# Patient Record
Sex: FEMALE | Race: AMERICAN INDIAN OR ALASKA NATIVE
[De-identification: names, ages, dates, MRNs, and addresses within clinical notes are randomized per-mention and may not be internally consistent; named-entity substitution may affect disease eponyms.]

---

## 2017-02-25 ENCOUNTER — HOSPITAL ENCOUNTER (EMERGENCY)
Dept: HOSPITAL 5 - ED | Age: 67
Discharge: HOME | End: 2017-02-25
Payer: MEDICARE

## 2017-02-25 VITALS — DIASTOLIC BLOOD PRESSURE: 84 MMHG | SYSTOLIC BLOOD PRESSURE: 209 MMHG

## 2017-02-25 DIAGNOSIS — I10: ICD-10-CM

## 2017-02-25 DIAGNOSIS — I25.2: ICD-10-CM

## 2017-02-25 DIAGNOSIS — I50.9: ICD-10-CM
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
## 2017-02-25 DIAGNOSIS — M25.50: ICD-10-CM

## 2017-02-25 DIAGNOSIS — Z95.1: ICD-10-CM

## 2017-02-25 DIAGNOSIS — E11.9: ICD-10-CM
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
## 2017-02-25 DIAGNOSIS — M51.36: Primary | ICD-10-CM
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
## 2017-02-25 DIAGNOSIS — J44.9: ICD-10-CM

## 2017-02-25 DIAGNOSIS — M19.90: ICD-10-CM

## 2017-02-25 DIAGNOSIS — M85.80: ICD-10-CM

## 2017-02-25 DIAGNOSIS — K21.9: ICD-10-CM

## 2017-02-25 PROCEDURE — 72170 X-RAY EXAM OF PELVIS: CPT

## 2017-02-25 PROCEDURE — 99283 EMERGENCY DEPT VISIT LOW MDM: CPT

## 2017-02-25 PROCEDURE — 72100 X-RAY EXAM L-S SPINE 2/3 VWS: CPT

## 2017-02-25 NOTE — EMERGENCY DEPARTMENT REPORT
ED Fall HPI





- General


Chief Complaint: Fall


Stated Complaint: INJURY FROM FALL


Time Seen by Provider: 17 09:01


Source: patient, family


Mode of arrival: Ambulatory





- History of Present Illness


Initial Comments: 





Patient here with family and she reports that she stumbled and fell while 

stepping down and walking approximately 2 weeks ago.  She is complaining the 

pain in her hips and pelvic area and also complaining the lower back pain.  

Patient is ambulatory.  She denies any blood in the urine or stool.  Denies any 

loss of bowel or bladder control.  Denies any head injury.  Denies any numbness 

or tingling to extremities.  Denies any chest pain or shortness of breath.


MD Complaint: fall


Onset/Timin


-: week(s)


Fall From: standing


When Fall Occurred: # days PTA (14)


Fall Witnessed: yes, by family


Place Fall Occurred: home


Loss of Consciousness: none


Prolonged Down Time?: no


Symptoms Prior to Fall: none


Location: back, pelvis (pelvis and bilateral hip)


Severity: moderate


Severity scale (0 -10): 6


Quality: aching


Context: tripped/slipped


Associated Symptoms: denies: headache, neck pain, numbness, weakness, chest 

paint, shortness of breath, abdominal pain, hematuria, unable to walk, 

lightheaded, vertigo, confusion





- Related Data


 Home Medications











 Medication  Instructions  Recorded  Confirmed  Last Taken


 


Clopidogrel [Plavix] 75 mg PO DAILY 12/09/14 10/16/15 05/13/16


 


Gabapentin 300 mg PO BID 12/09/14 10/16/15 05/18/16


 


Insulin NPH Hum/Reg Insulin Hm 45 units SQ BID 12/09/14 10/16/15 05/18/16





[HumuLIN 70-30 Vial]    


 


Ipratropium/Albuterol Sulfate 1 spray IH QID PRN 10/16/15 10/16/15 05/18/16





[Combivent Respimat]    


 


Lisinopril [Zestril TAB] 20 mg PO BID 10/16/15 10/16/15 05/18/16


 


traMADol [Ultram 50 MG tab] 50 mg PO PRN 10/16/15 10/16/15 05/18/16








 Previous Rx's











 Medication  Instructions  Recorded  Last Taken  Type


 


Nitroglycerin [Nitrostat] 0.4 mg SL Q5M #100 tab 14 Rx


 


Aspirin [Aspirin BABY CHEW TAB] 81 mg PO QDAY #30 tab.chew 12/19/14 05/15/16 Rx


 


Furosemide [Lasix TAB] 20 mg PO QDAY #30 tablet 14 Rx


 


Lactobacillus Acidophil [Lactinex] 1 each PO TID #30 tablet 10/21/15 05/18/16 Rx


 


Levofloxacin [Levaquin TAB] 750 mg PO QDAY #10 tablet 10/21/15 05/18/16 Rx


 


Metoprolol [Lopressor TAB] 12.5 mg PO BID #30 tablet 10/21/15 05/18/16 Rx


 


Sulfamethoxazole/Trimethoprim 1 each PO BID #20 tablet 10/21/15 05/18/16 Rx





[Bactrim DS TAB]    


 


metroNIDAZOLE [Flagyl TAB] 500 mg PO Q8H #30 tablet 10/21/15 05/18/16 Rx


 


predniSONE [Deltasone] 5 mg PO QDAY #14 tab 17 Unknown Rx


 


traMADol [Ultram 50 MG tab] 50 mg PO Q6HR PRN #20 tablet 17 Unknown Rx











 Allergies











Allergy/AdvReac Type Severity Reaction Status Date / Time


 


atorvastatin calcium Allergy  Unknown Verified 17 08:03





[From Lipitor]     


 


Iodinated Contrast Media - Allergy  Unknown Verified 17 08:03





IV Dye     


 


Penicillins Allergy  Unknown Verified 17 08:03


 


tomato Allergy  Unknown Verified 17 08:03














ED Review of Systems


ROS: 


Stated complaint: INJURY FROM FALL


Other details as noted in HPI





Comment: All other systems reviewed and negative


Constitutional: denies: chills, fever


Eyes: denies: vision change


ENT: denies: ear pain, hearing loss


Respiratory: no symptoms reported


Cardiovascular: denies: chest pain, palpitations, edema, syncope


Gastrointestinal: denies: abdominal pain, nausea, vomiting


Genitourinary: denies: urgency, dysuria, frequency, hematuria


Musculoskeletal: back pain, arthralgia


Skin: denies: rash


Neurological: abnormal gait (abnormal gait due to pain in hips).  denies: 

headache, numbness, paresthesias, vertigo





ED Past Medical Hx





- Past Medical History


Previous Medical History?: Yes


Hx Hypertension: Yes


Hx Heart Attack/AMI: Yes


Hx Congestive Heart Failure: Yes


Hx Diabetes: Yes


Hx GERD: Yes (GERD)


Hx Renal Disease: Yes (renal stones)


Hx Arthritis: Yes


Hx Kidney Stones: Yes


Hx COPD: Yes


Hx HIV: No





- Surgical History


Past Surgical History?: Yes


Hx Coronary Stent: Yes


Hx Open Heart Surgery: Yes (CABG X2)


Additional Surgical History: Open heart





- Family History


Family history: diabetes, hypertension





- Social History


Smoking Status: Never Smoker


Substance Use Type: None





- Medications


Home Medications: 


 Home Medications











 Medication  Instructions  Recorded  Confirmed  Last Taken  Type


 


Clopidogrel [Plavix] 75 mg PO DAILY 12/09/14 10/16/15 05/13/16 History


 


Gabapentin 300 mg PO BID 12/09/14 10/16/15 05/18/16 History


 


Insulin NPH Hum/Reg Insulin Hm 45 units SQ BID 12/09/14 10/16/15 05/18/16 

History





[HumuLIN 70-30 Vial]     


 


Nitroglycerin [Nitrostat] 0.4 mg SL Q5M #100 tab 12/18/14 10/16/15 03/01/16 Rx


 


Aspirin [Aspirin BABY CHEW TAB] 81 mg PO QDAY #30 tab.chew 12/19/14 10/16/15 05/

15/16 Rx


 


Furosemide [Lasix TAB] 20 mg PO QDAY #30 tablet 12/19/14 10/16/15 05/18/16 Rx


 


Ipratropium/Albuterol Sulfate 1 spray IH QID PRN 10/16/15 10/16/15 05/18/16 

History





[Combivent Respimat]     


 


Lisinopril [Zestril TAB] 20 mg PO BID 10/16/15 10/16/15 05/18/16 History


 


traMADol [Ultram 50 MG tab] 50 mg PO PRN 10/16/15 10/16/15 05/18/16 History


 


Lactobacillus Acidophil [Lactinex] 1 each PO TID #30 tablet 10/21/15  05/18/16 

Rx


 


Levofloxacin [Levaquin TAB] 750 mg PO QDAY #10 tablet 10/21/15  05/18/16 Rx


 


Metoprolol [Lopressor TAB] 12.5 mg PO BID #30 tablet 10/21/15  05/18/16 Rx


 


Sulfamethoxazole/Trimethoprim 1 each PO BID #20 tablet 10/21/15  05/18/16 Rx





[Bactrim DS TAB]     


 


metroNIDAZOLE [Flagyl TAB] 500 mg PO Q8H #30 tablet 10/21/15  05/18/16 Rx


 


predniSONE [Deltasone] 5 mg PO QDAY #14 tab 17  Unknown Rx


 


traMADol [Ultram 50 MG tab] 50 mg PO Q6HR PRN #20 tablet 17  Unknown Rx














ED Physical Exam





- General


Limitations: No Limitations


General appearance: alert, in no apparent distress





- Head


Head exam: Present: atraumatic, normocephalic, normal inspection





- Expanded Head Exam


  ** Expanded


Head exam: Absent: laceration, abrasion, contusion, hematoma, racoon eyes, 

phoenix's sign, general tenderness, tenderness of temporal artery, CSF rhinorrhea

, CSF otorrhea





- Eye


Eye exam: Present: normal appearance, PERRL, EOMI.  Absent: periorbital swelling

, periorbital tenderness


Pupils: Present: normal accommodation





- ENT


ENT exam: Present: normal exam, normal orophraynx, mucous membranes moist, TM's 

normal bilaterally, normal external ear exam





- Neck


Neck exam: Present: normal inspection, full ROM.  Absent: tenderness, 

meningismus, lymphadenopathy





- Respiratory


Respiratory exam: Present: normal lung sounds bilaterally.  Absent: respiratory 

distress, chest wall tenderness





- Cardiovascular


Cardiovascular Exam: Present: regular rate, normal rhythm, normal heart sounds





- GI/Abdominal


GI/Abdominal exam: Present: soft, normal bowel sounds.  Absent: distended, 

tenderness, guarding, rebound, rigid





- Extremities Exam


Extremities exam: Present: normal inspection, full ROM, normal capillary refill

, other (nontender to palpate in both hips.  No shortening of the legs.  

Patient will good color movement temperature and sensation to extremities.  +2 

pedal pulses.  No neurovascular compromise.  Patient able to ambulate with 

minimal assistance.).  Absent: tenderness, pedal edema, joint swelling, calf 

tenderness





- Back Exam


Back exam: Present: normal inspection, full ROM.  Absent: tenderness, CVA 

tenderness (R), CVA tenderness (L), muscle spasm, paraspinal tenderness, 

vertebral tenderness, rash noted





- Expanded Back Exam


  ** Expanded


Back exam: Absent: saddle anesthesia


Back exam: Negative Straight Leg Raising: Left, Right





- Neurological Exam


Neurological exam: Present: alert, oriented X3, abnormal gait (abnormal gait 

due to pelvic pain), reflexes normal.  Absent: motor sensory deficit





- Psychiatric


Psychiatric exam: Present: normal affect, normal mood





- Skin


Skin exam: Present: warm, dry, intact, normal color.  Absent: rash





ED Course


 Vital Signs











  17





  07:54


 


Temperature 98.8 F


 


Pulse Rate 67


 


Respiratory 17





Rate 


 


Blood Pressure 201/86


 


O2 Sat by Pulse 100





Oximetry 














- Reevaluation(s)


Reevaluation #1: 





17 11:57


given Norco 5/325 mg one tablet by mouth in emergency room which relieved her 

pain.





ED Medical Decision Making





- Radiology Data


Radiology results: report reviewed





X-ray of pelvic revealed no acute fracture or dislocation.


x-ray  lumbar spine revealed osteopenia with degenerative changes 





- Medical Decision Making





ED course: I informed patient and her family member that x-ray of her back and 

pelvis area revealed no acute findings.  Told her that she has some arthritis 

in her lower back along with osteopenia which is a precursor for osteoporosis.  

Informed patient that she needs to follow-up with her primary care doctor and 

started taking calcium and vitamin D supplements to prevent progression of 

osteopenia.  Discharge instructions given on fall prevention an elderly, 

osteopenia, degenerative disc disease.  Patient and family states understanding 

of diagnosis and treatment plan. Pt Also requesting refill on her prednisone 

which she takes for sarcoidosis in her lungs.  She has an appointment with her 

lung doctor in 2 weeks and she ran out.  He says she takes 5 mg daily.  Patient 

discharged home with prescription for Ultram and prednisone.


Critical care attestation.: 


If time is entered above; I have spent that time in minutes in the direct care 

of this critically ill patient, excluding procedure time.








ED Disposition


Clinical Impression: 


 Arthralgia of multiple sites, Osteopenia, Degenerative disc disease, lumbar





Fall, accidental


Qualifiers:


 Encounter type: initial encounter Qualified Code(s): W19.XXXA - Unspecified 

fall, initial encounter





Pain in lower back


Qualifiers:


 Chronicity: unspecified Back pain laterality: bilateral Sciatica presence: 

without sciatica Qualified Code(s): M54.5 - Low back pain





Disposition: DISCHARGED TO HOME OR SELFCARE


Is pt being admited?: No


Does the pt Need Aspirin: No


Condition: Stable


Instructions:  Degenerative Disc Disease (ED), Fall Prevention for Older Adults 

(ED), Arthralgia (ED)


Additional Instructions: 


You have a condition called osteopenia which is a precursor for osteoporosis.  

You will need  to start taking calcium and vitamin D and follow-up with primary 

care physician for management.


Please follow-up with orthopedic doctor if he continues to have pain.


Prescriptions: 


predniSONE [Deltasone] 5 mg PO QDAY #14 tab


traMADol [Ultram 50 MG tab] 50 mg PO Q6HR PRN #20 tablet


 PRN Reason: Pain


Referrals: 


PRIMARY CARE,MD [Primary Care Provider] - 3-5 Days


DM GODFREY MD [Staff Physician] - 3-5 Days


Forms:  Accompanied Note

## 2017-02-25 NOTE — XRAY REPORT
AP PELVIS:



History: Fall with pelvic and hip pain.



AP view of the pelvis shows normal pelvic contour and soft

tissues. The hips are symmetric and within normal limits as are the 

sacroiliac joints.



IMPRESSION:

No acute injury is appreciated.

## 2017-02-25 NOTE — XRAY REPORT
LUMBOSACRAL SPINE, 3 VIEWS:



History: Back pain



Findings: Mild osteopenia is suspected. The vertebral bodies, disk 

spaces and posterior elements are intact.  No compression deformity or 

malalignment.  Mild multilevel degenerative disc disease and facet 

arthropathy are noted. The SI joints are symmetric and unremarkable.



Impression:

No acute injury is identified.

Osteopenia.

Degenerative changes.

## 2017-12-09 ENCOUNTER — HOSPITAL ENCOUNTER (EMERGENCY)
Dept: HOSPITAL 5 - ED | Age: 67
Discharge: LEFT BEFORE DISCHARGE | End: 2017-12-09
Payer: MEDICARE

## 2017-12-09 VITALS — SYSTOLIC BLOOD PRESSURE: 197 MMHG | DIASTOLIC BLOOD PRESSURE: 83 MMHG

## 2017-12-09 DIAGNOSIS — Z86.73: ICD-10-CM

## 2017-12-09 DIAGNOSIS — I13.0: ICD-10-CM

## 2017-12-09 DIAGNOSIS — Z91.041: ICD-10-CM

## 2017-12-09 DIAGNOSIS — E87.6: ICD-10-CM

## 2017-12-09 DIAGNOSIS — N18.9: ICD-10-CM

## 2017-12-09 DIAGNOSIS — I50.9: ICD-10-CM

## 2017-12-09 DIAGNOSIS — R55: Primary | ICD-10-CM

## 2017-12-09 DIAGNOSIS — E11.22: ICD-10-CM

## 2017-12-09 DIAGNOSIS — M19.90: ICD-10-CM

## 2017-12-09 DIAGNOSIS — J44.9: ICD-10-CM

## 2017-12-09 DIAGNOSIS — Z95.818: ICD-10-CM

## 2017-12-09 DIAGNOSIS — I25.2: ICD-10-CM

## 2017-12-09 DIAGNOSIS — Z95.1: ICD-10-CM

## 2017-12-09 DIAGNOSIS — Z88.8: ICD-10-CM

## 2017-12-09 LAB
ANION GAP SERPL CALC-SCNC: 14 MMOL/L
APTT BLD: 23.7 SEC. (ref 24.2–36.6)
BASOPHILS NFR BLD AUTO: 0.4 % (ref 0–1.8)
BUN SERPL-MCNC: 17 MG/DL (ref 7–17)
BUN/CREAT SERPL: 13 %
CALCIUM SERPL-MCNC: 8.5 MG/DL (ref 8.4–10.2)
CHLORIDE SERPL-SCNC: 103.7 MMOL/L (ref 98–107)
CO2 SERPL-SCNC: 28 MMOL/L (ref 22–30)
EOSINOPHIL NFR BLD AUTO: 1.4 % (ref 0–4.3)
GLUCOSE SERPL-MCNC: 65 MG/DL (ref 65–100)
HCT VFR BLD CALC: 41.8 % (ref 30.3–42.9)
HGB BLD-MCNC: 13.4 GM/DL (ref 10.1–14.3)
INR PPP: 0.83 (ref 0.87–1.13)
MCH RBC QN AUTO: 26 PG (ref 28–32)
MCHC RBC AUTO-ENTMCNC: 32 % (ref 30–34)
MCV RBC AUTO: 81 FL (ref 79–97)
PLATELET # BLD: 178 K/MM3 (ref 140–440)
POTASSIUM SERPL-SCNC: 3.4 MMOL/L (ref 3.6–5)
RBC # BLD AUTO: 5.16 M/MM3 (ref 3.65–5.03)
SODIUM SERPL-SCNC: 142 MMOL/L (ref 137–145)
WBC # BLD AUTO: 9 K/MM3 (ref 4.5–11)

## 2017-12-09 PROCEDURE — 36415 COLL VENOUS BLD VENIPUNCTURE: CPT

## 2017-12-09 PROCEDURE — 85610 PROTHROMBIN TIME: CPT

## 2017-12-09 PROCEDURE — 93005 ELECTROCARDIOGRAM TRACING: CPT

## 2017-12-09 PROCEDURE — 72170 X-RAY EXAM OF PELVIS: CPT

## 2017-12-09 PROCEDURE — 84484 ASSAY OF TROPONIN QUANT: CPT

## 2017-12-09 PROCEDURE — 72125 CT NECK SPINE W/O DYE: CPT

## 2017-12-09 PROCEDURE — 93010 ELECTROCARDIOGRAM REPORT: CPT

## 2017-12-09 PROCEDURE — 99284 EMERGENCY DEPT VISIT MOD MDM: CPT

## 2017-12-09 PROCEDURE — 70450 CT HEAD/BRAIN W/O DYE: CPT

## 2017-12-09 PROCEDURE — 85025 COMPLETE CBC W/AUTO DIFF WBC: CPT

## 2017-12-09 PROCEDURE — 83735 ASSAY OF MAGNESIUM: CPT

## 2017-12-09 PROCEDURE — 85730 THROMBOPLASTIN TIME PARTIAL: CPT

## 2017-12-09 PROCEDURE — 80048 BASIC METABOLIC PNL TOTAL CA: CPT

## 2017-12-09 NOTE — XRAY REPORT
XRAY RIGHT TIBIA AND FIBULA TWO VIEWS: 12/09/17 08:04:00





CLINICAL: Weakness and unable to bear weight.



FINDINGS: No fracture or dislocation. Normal alignment at the knee and 

at the ankle.  No knee joint effusion. Surgical clips in the medial 

soft tissues.No soft tissue air in the soft tissue edema.



IMPRESSION: Negative study.

## 2017-12-09 NOTE — XRAY REPORT
XRAY RIGHT FEMUR TWO VIEWS: 12/09/17 08:04:00





CLINICAL: Weakness and unable to bear weight on the right leg.



FINDINGS: Normal alignment at the hip and knee. Moderate osteoarthritis 

of the right hip.  No fracture or dislocation.  Vascular calcifications 

in otherwise normal soft tissues.  The knee joint is unremarkable. No 

fracture.  





IMPRESSION: Moderate osteoarthritis of the right hip.  The femur is 

otherwise normal.

## 2017-12-09 NOTE — CAT SCAN REPORT
CT HEAD WITHOUT CONTRAST: 12/09/17 09:57



CLINICAL: Dizziness.  On blood thinners.



TECHNIQUE: 2.5-mm noncontrast scans.



COMPARISON:None



FINDINGS: The ventricles and sulci are normal for age.Moderate 

bilateral periventricular white matter hypodensities.  Small right 

frontal white matter chronic lacunar infarct.  A right thalamic lacunar 

infarct is of uncertain age.  No mass or mass effect.  No hemorrhage, 

edema or extra-axial collection.  The sinuses are clear.  Normal orbits 

and soft tissues.  The calvarium and skull base are intact.



IMPRESSION: A right thalamic lacunar infarct of uncertain age.  Chronic 

right frontal white matter or infarct and moderate chronic white matter 

microangiopathy. No hemorrhage.

## 2017-12-09 NOTE — EMERGENCY DEPARTMENT REPORT
ED Dizziness HPI





- General


Chief Complaint: Neuro Symptoms/Deficit


Stated Complaint: FALL


Time Seen by Provider: 12/09/17 11:23


Source: patient


Mode of arrival: Ambulatory


Limitations: No Limitations





- History of Present Illness


Initial Comments: 





67-year-old female with a past medical history of CABG, CHF, COPD, CVA, diabetes

, GERD, and hypertension presents to the hospital with complaints of near 

syncopal episode with fall.  Patient lives alone and her son came to visit.  

Upon ambulating to the door to let him and she felt dizzy to the point of 

falling and striking the back of her head on the door.  Her son witnessed the 

episode and no LOC reported.  Upon trying to stand patient felt like a pulling 

sensation in her right leg like it was giving out on her and she is complaining 

of mild headache after fall to the back of her head.  Patient complains of 

chronic intermittent chest pain at is unchanged from her baseline.  Patient 

denies shortness of breath, nausea, vomiting, or diaphoresis.  Patient saw her 

outpatient cardiologist wanted December 6 and had her metoprolol ER increased 

from 25 mg to 50 mg.  Since this increased patient has been sleeping more and 

having dizzy episodes.  Cardiologist group: Dr. AJIT Simons with [facility].





- Related Data


 Home Medications











 Medication  Instructions  Recorded  Confirmed  Last Taken


 


Clopidogrel [Plavix] 75 mg PO DAILY 12/09/14 10/16/15 05/13/16


 


Gabapentin 300 mg PO BID 12/09/14 10/16/15 05/18/16


 


Insulin NPH Hum/Reg Insulin Hm 45 units SQ BID 12/09/14 10/16/15 05/18/16





[HumuLIN 70-30 Vial]    


 


Ipratropium/Albuterol Sulfate 1 spray IH QID PRN 10/16/15 10/16/15 05/18/16





[Combivent Respimat]    


 


Lisinopril [Zestril TAB] 20 mg PO BID 10/16/15 10/16/15 05/18/16


 


traMADol [Ultram 50 MG tab] 50 mg PO PRN 10/16/15 10/16/15 05/18/16








 Previous Rx's











 Medication  Instructions  Recorded  Last Taken  Type


 


Nitroglycerin [Nitrostat] 0.4 mg SL Q5M #100 tab 12/18/14 03/01/16 Rx


 


Aspirin [Aspirin BABY CHEW TAB] 81 mg PO QDAY #30 tab.chew 12/19/14 05/15/16 Rx


 


Furosemide [Lasix TAB] 20 mg PO QDAY #30 tablet 12/19/14 05/18/16 Rx


 


Lactobacillus Acidophil [Lactinex] 1 each PO TID #30 tablet 10/21/15 05/18/16 Rx


 


Levofloxacin [Levaquin TAB] 750 mg PO QDAY #10 tablet 10/21/15 05/18/16 Rx


 


Metoprolol [Lopressor TAB] 12.5 mg PO BID #30 tablet 10/21/15 05/18/16 Rx


 


Sulfamethoxazole/Trimethoprim 1 each PO BID #20 tablet 10/21/15 05/18/16 Rx





[Bactrim DS TAB]    


 


metroNIDAZOLE [Flagyl TAB] 500 mg PO Q8H #30 tablet 10/21/15 05/18/16 Rx


 


predniSONE [Deltasone] 5 mg PO QDAY #14 tab 02/25/17 Unknown Rx


 


traMADol [Ultram 50 MG tab] 50 mg PO Q6HR PRN #20 tablet 02/25/17 Unknown Rx











 Allergies











Allergy/AdvReac Type Severity Reaction Status Date / Time


 


atorvastatin calcium Allergy  Unknown Verified 02/25/17 08:03





[From Lipitor]     


 


Iodinated Contrast- Oral and Allergy  Unknown Verified 02/25/17 08:03





IV Dye     





[Iodinated Contrast Media -     





IV Dye]     


 


Penicillins Allergy  Unknown Verified 02/25/17 08:03


 


tomato Allergy  Unknown Verified 02/25/17 08:03














ED Review of Systems


ROS: 


Stated complaint: FALL


Other details as noted in HPI





Comment: All other systems reviewed and negative


Other: 





Constitutional: No fevers chills 


Eyes: No eye pain visual changes 


ENT: No ear pain or throat pain


Neck: Denies pain


Respiratory: Denies cough wheezing shortness of breath 


Cardiovascular: Denies  palpitations


GI: Denies abdominal pain, nausea, vomiting, diarrhea


: Denies dysuria


Musculoskeletal: As per HPI


Skin: Denies rash, lesions, erythema


Neurologic: Denies numbness, weakness


Psychiatric: Denies suicidal ideation, hallucinations








ED Past Medical Hx





- Past Medical History


Previous Medical History?: Yes


Hx Hypertension: Yes


Hx CVA: Yes


Hx Heart Attack/AMI: Yes


Hx Congestive Heart Failure: Yes


Hx Diabetes: Yes


Hx GERD: Yes (GERD)


Hx Liver Disease: No


Hx Renal Disease: Yes (renal stones)


Hx Arthritis: Yes


Hx Kidney Stones: Yes


Hx Asthma: No


Hx COPD: Yes


Hx HIV: No





- Surgical History


Past Surgical History?: Yes


Hx Coronary Stent: Yes


Hx Open Heart Surgery: Yes (CABG X2)


Additional Surgical History: Open heart





- Social History


Smoking Status: Never Smoker


Substance Use Type: None





- Medications


Home Medications: 


 Home Medications











 Medication  Instructions  Recorded  Confirmed  Last Taken  Type


 


Clopidogrel [Plavix] 75 mg PO DAILY 12/09/14 10/16/15 05/13/16 History


 


Gabapentin 300 mg PO BID 12/09/14 10/16/15 05/18/16 History


 


Insulin NPH Hum/Reg Insulin Hm 45 units SQ BID 12/09/14 10/16/15 05/18/16 

History





[HumuLIN 70-30 Vial]     


 


Nitroglycerin [Nitrostat] 0.4 mg SL Q5M #100 tab 12/18/14 10/16/15 03/01/16 Rx


 


Aspirin [Aspirin BABY CHEW TAB] 81 mg PO QDAY #30 tab.chew 12/19/14 10/16/15 05/

15/16 Rx


 


Furosemide [Lasix TAB] 20 mg PO QDAY #30 tablet 12/19/14 10/16/15 05/18/16 Rx


 


Ipratropium/Albuterol Sulfate 1 spray IH QID PRN 10/16/15 10/16/15 05/18/16 

History





[Combivent Respimat]     


 


Lisinopril [Zestril TAB] 20 mg PO BID 10/16/15 10/16/15 05/18/16 History


 


traMADol [Ultram 50 MG tab] 50 mg PO PRN 10/16/15 10/16/15 05/18/16 History


 


Lactobacillus Acidophil [Lactinex] 1 each PO TID #30 tablet 10/21/15  05/18/16 

Rx


 


Levofloxacin [Levaquin TAB] 750 mg PO QDAY #10 tablet 10/21/15  05/18/16 Rx


 


Metoprolol [Lopressor TAB] 12.5 mg PO BID #30 tablet 10/21/15  05/18/16 Rx


 


Sulfamethoxazole/Trimethoprim 1 each PO BID #20 tablet 10/21/15  05/18/16 Rx





[Bactrim DS TAB]     


 


metroNIDAZOLE [Flagyl TAB] 500 mg PO Q8H #30 tablet 10/21/15  05/18/16 Rx


 


predniSONE [Deltasone] 5 mg PO QDAY #14 tab 02/25/17  Unknown Rx


 


traMADol [Ultram 50 MG tab] 50 mg PO Q6HR PRN #20 tablet 02/25/17  Unknown Rx














ED Physical Exam





- General


Limitations: No Limitations





- Other


Other exam information: 





General: No limitations, patient is alert in no acute distress


Head exam: Atraumatic, normocephalic


Eyes exam: Normal appearance, pupils equal reactive to light, extraocular 

movements intact


ENT: Moist mucous membrane, normal oropharynx


Neck exam: Normal inspection, full range of motion, no meningismus nontender


Respiratory exam: Clear to auscultation bilateral, no wheezes, rales, crackles


Cardiovascular: Normal rate and rhythm, normal heart sounds, positive 

sternotomy scar


Abdomen: Soft, nondistended, and  nontender, with normal bowel sounds, no 

rebound, or guarding


Extremity: Full range of motion normal inspection no deformity, full range of 

motion of hip, knee, and ankle without discomfort.  Mild tenderness to right 

thigh area.  No deformity.


Back: Normal Inspection, full range of motion, no tenderness


Neurologic: Alert, oriented x3, cranial nerves intact, no motor or sensory 

deficit


Psychiatric: normal affect, normal mood


Skin: Warm, dry, intact





ED Course


 Vital Signs











  12/09/17 12/09/17





  09:03 11:47


 


Temperature 98.4 F 


 


Pulse Rate 96 H 70


 


Respiratory 16 18





Rate  


 


Blood Pressure 168/61 192/65





[Left]  


 


O2 Sat by Pulse 100 100





Oximetry  














- Reevaluation(s)


Reevaluation #1: 





12/09/17 13:59


Awaiting urine collection and patient refused catheterization





ED Medical Decision Making





- Lab Data


Result diagrams: 


 12/09/17 08:53





 12/09/17 08:53








 Lab Results











  12/09/17 12/09/17 12/09/17 Range/Units





  08:53 08:53 08:53 


 


WBC  9.0    (4.5-11.0)  K/mm3


 


RBC  5.16 H    (3.65-5.03)  M/mm3


 


Hgb  13.4    (10.1-14.3)  gm/dl


 


Hct  41.8    (30.3-42.9)  %


 


MCV  81    (79-97)  fl


 


MCH  26 L    (28-32)  pg


 


MCHC  32    (30-34)  %


 


RDW  14.8    (13.2-15.2)  %


 


Plt Count  178    (140-440)  K/mm3


 


Lymph % (Auto)  28.2    (13.4-35.0)  %


 


Mono % (Auto)  8.0 H    (0.0-7.3)  %


 


Eos % (Auto)  1.4    (0.0-4.3)  %


 


Baso % (Auto)  0.4    (0.0-1.8)  %


 


Lymph #  2.5    (1.2-5.4)  K/mm3


 


Mono #  0.7    (0.0-0.8)  K/mm3


 


Eos #  0.1    (0.0-0.4)  K/mm3


 


Baso #  0.0    (0.0-0.1)  K/mm3


 


Seg Neutrophils %  62.0    (40.0-70.0)  %


 


Seg Neutrophils #  5.5    (1.8-7.7)  K/mm3


 


PT    11.8 L  (12.2-14.9)  Sec.


 


INR    0.83 L  (0.87-1.13)  


 


APTT    23.7 L  (24.2-36.6)  Sec.


 


Sodium   142   (137-145)  mmol/L


 


Potassium   3.4 L   (3.6-5.0)  mmol/L


 


Chloride   103.7   ()  mmol/L


 


Carbon Dioxide   28   (22-30)  mmol/L


 


Anion Gap   14   mmol/L


 


BUN   17   (7-17)  mg/dL


 


Creatinine   1.3 H   (0.7-1.2)  mg/dL


 


Estimated GFR   49   ml/min


 


BUN/Creatinine Ratio   13   %


 


Glucose   65   ()  mg/dL


 


Calcium   8.5   (8.4-10.2)  mg/dL


 


Magnesium     (1.7-2.3)  mg/dL


 


Troponin T     (0.00-0.029)  ng/mL














  12/09/17 12/09/17 Range/Units





  08:53 08:53 


 


WBC    (4.5-11.0)  K/mm3


 


RBC    (3.65-5.03)  M/mm3


 


Hgb    (10.1-14.3)  gm/dl


 


Hct    (30.3-42.9)  %


 


MCV    (79-97)  fl


 


MCH    (28-32)  pg


 


MCHC    (30-34)  %


 


RDW    (13.2-15.2)  %


 


Plt Count    (140-440)  K/mm3


 


Lymph % (Auto)    (13.4-35.0)  %


 


Mono % (Auto)    (0.0-7.3)  %


 


Eos % (Auto)    (0.0-4.3)  %


 


Baso % (Auto)    (0.0-1.8)  %


 


Lymph #    (1.2-5.4)  K/mm3


 


Mono #    (0.0-0.8)  K/mm3


 


Eos #    (0.0-0.4)  K/mm3


 


Baso #    (0.0-0.1)  K/mm3


 


Seg Neutrophils %    (40.0-70.0)  %


 


Seg Neutrophils #    (1.8-7.7)  K/mm3


 


PT    (12.2-14.9)  Sec.


 


INR    (0.87-1.13)  


 


APTT    (24.2-36.6)  Sec.


 


Sodium    (137-145)  mmol/L


 


Potassium    (3.6-5.0)  mmol/L


 


Chloride    ()  mmol/L


 


Carbon Dioxide    (22-30)  mmol/L


 


Anion Gap    mmol/L


 


BUN    (7-17)  mg/dL


 


Creatinine    (0.7-1.2)  mg/dL


 


Estimated GFR    ml/min


 


BUN/Creatinine Ratio    %


 


Glucose    ()  mg/dL


 


Calcium    (8.4-10.2)  mg/dL


 


Magnesium  2.10   (1.7-2.3)  mg/dL


 


Troponin T   < 0.010  (0.00-0.029)  ng/mL














- EKG Data


-: EKG Interpreted by Me (old anterolateral infarct, inferior infarct)


EKG shows normal: sinus rhythm, axis (264), QRS complexes (96), ST-T waves (

lateral T-wave inversion)


Rate: normal





- Radiology Data


Radiology results: report reviewed





Read by radiologist


CT cervical spine: No acute findings


X-ray pelvis: Moderate bilateral hip arthritis.  Bilateral sacroiliitis without 

erosions


CT head non-contrast: A right thalamus lacunar infarct of uncertain age.  

Chronic right frontal white matter or infarct in moderate chronic white matter 

microangiopathic.  No hemorrhage


X-ray right femur: Moderate osteoarthritis of the right hip.  Otherwise normal


Right tib-fib x-ray: Negative study





- Medical Decision Making





Plan to admit patient to the hospital for near syncopal episode with associated 

fall.  Episodes of dizziness corresponds with increased metoprolol dose.  EKG 

shows mild bradycardia with a heart rate of 59 but vital signs reflect a heart 

rate above 60.  No signs of hypotension.  Patient received a by mouth dose of 

potassium for mild hypokalemia








Patient refuses admission.  I explained my concern about her near syncopal 

episode and possible medication reaction.  Patient states she wants to go home.

  She was able to  the ER without assistance and walk a few steps with 

no signs of weakness.  Urine was not obtained prior to leaving the department.  

Patient notified a risk of worsening symptoms, syncope, heart arrhythmia, and 

possible death.





- Differential Diagnosis


UTI, anemia, bradycardia, MI, unstable angina, ICH, fracture, contusion


Critical Care Time: No


Critical care attestation.: 


If time is entered above; I have spent that time in minutes in the direct care 

of this critically ill patient, excluding procedure time.








ED Disposition


Clinical Impression: 


 Near syncope, Hx of CABG, Diabetes, Hypokalemia, HTN (hypertension)





Disposition: DC-07 LEFT AGAINST MED ADVICE


Is pt being admited?: No


Condition: Stable


Instructions:  Hypertension (ED), Lightheadedness (ED), Hypokalemia (ED)


Additional Instructions: 


You have refused admission at this time.  It is very important that you will  

up with your primary care doctor and cardiologist as soon as possible for 

further treatment.  Return if symptoms worsen


Referrals: 


PRIMARY MD ASHUTOSH [Primary Care Provider] - 24 Hours


JONG SIMONS MD [Staff Physician] - 24 Hours


Time of Disposition: 14:04 (Dr shah/hosp)

## 2017-12-09 NOTE — CAT SCAN REPORT
CT CERVICAL SPINE WITHOUT CONTRAST:12/09/17 08:04:00



CLINICAL: Near syncope and fall with head injury.



TECHNIQUE: Volumetric acquisition and 1.25-mm scan reconstructions 

without contrast.  Sagittal and coronal reformats were performed.



FINDINGS: Normal vertebral body height, alignment and disk spaces.  No 

fracture or subluxation.   Minimal degenerative change. No apparent 

disc protrusions or bulges.Normal soft tissues and airway.  





IMPRESSION: Negative with no apparent traumatic injury.

## 2017-12-09 NOTE — XRAY REPORT
X-RAY AP PELVIS ONE VIEW: 12/09/17 08:04:00





CLINICAL: Pain.



FINDINGS: The pelvic bones are intact.  Moderate arthritis of the hips. 

 Bilateral SI joint sclerosis with no erosions.  No fracture or 

dislocation.  Vascular calcifications.



IMPRESSION: Moderate bilateral hip arthritis.  Bilateral sacroiliitis 

without erosions.

## 2018-01-03 ENCOUNTER — HOSPITAL ENCOUNTER (OUTPATIENT)
Dept: HOSPITAL 5 - ED | Age: 68
Setting detail: OBSERVATION
LOS: 1 days | Discharge: OTHER INSTITUTION | End: 2018-01-04
Attending: INTERNAL MEDICINE | Admitting: INTERNAL MEDICINE
Payer: MEDICARE

## 2018-01-03 DIAGNOSIS — I21.19: Primary | ICD-10-CM

## 2018-01-03 DIAGNOSIS — Z95.1: ICD-10-CM

## 2018-01-03 DIAGNOSIS — I42.9: ICD-10-CM

## 2018-01-03 DIAGNOSIS — E78.5: ICD-10-CM

## 2018-01-03 DIAGNOSIS — I25.10: ICD-10-CM

## 2018-01-03 DIAGNOSIS — E11.9: ICD-10-CM

## 2018-01-03 DIAGNOSIS — I10: ICD-10-CM

## 2018-01-03 LAB
APTT BLD: 25.7 SEC. (ref 24.2–36.6)
APTT BLD: 60.3 SEC. (ref 24.2–36.6)
BASOPHILS # (AUTO): 0 K/MM3 (ref 0–0.1)
BASOPHILS NFR BLD AUTO: 0.2 % (ref 0–1.8)
BUN SERPL-MCNC: 17 MG/DL (ref 7–17)
BUN/CREAT SERPL: 14 %
CALCIUM SERPL-MCNC: 8.7 MG/DL (ref 8.4–10.2)
EOSINOPHIL # BLD AUTO: 0.1 K/MM3 (ref 0–0.4)
EOSINOPHIL NFR BLD AUTO: 1.1 % (ref 0–4.3)
HCT VFR BLD CALC: 41.8 % (ref 30.3–42.9)
HCT VFR BLD CALC: 44.2 % (ref 30.3–42.9)
HDLC SERPL-MCNC: 57 MG/DL (ref 40–59)
HEMOLYSIS INDEX: 14
HGB BLD-MCNC: 13.5 GM/DL (ref 10.1–14.3)
HGB BLD-MCNC: 14.1 GM/DL (ref 10.1–14.3)
INR PPP: 0.87 (ref 0.87–1.13)
INR PPP: 0.9 (ref 0.87–1.13)
LYMPHOCYTES # BLD AUTO: 2.1 K/MM3 (ref 1.2–5.4)
LYMPHOCYTES NFR BLD AUTO: 29.8 % (ref 13.4–35)
MCH RBC QN AUTO: 26 PG (ref 28–32)
MCHC RBC AUTO-ENTMCNC: 32 % (ref 30–34)
MCV RBC AUTO: 81 FL (ref 79–97)
MONOCYTES # (AUTO): 0.7 K/MM3 (ref 0–0.8)
MONOCYTES % (AUTO): 9.9 % (ref 0–7.3)
PLATELET # BLD: 172 K/MM3 (ref 140–440)
PLATELET # BLD: 177 K/MM3 (ref 140–440)
RBC # BLD AUTO: 5.15 M/MM3 (ref 3.65–5.03)

## 2018-01-03 PROCEDURE — 93459 L HRT ART/GRFT ANGIO: CPT

## 2018-01-03 PROCEDURE — 85049 AUTOMATED PLATELET COUNT: CPT

## 2018-01-03 PROCEDURE — 80061 LIPID PANEL: CPT

## 2018-01-03 PROCEDURE — 85610 PROTHROMBIN TIME: CPT

## 2018-01-03 PROCEDURE — 85730 THROMBOPLASTIN TIME PARTIAL: CPT

## 2018-01-03 PROCEDURE — 96375 TX/PRO/DX INJ NEW DRUG ADDON: CPT

## 2018-01-03 PROCEDURE — 99285 EMERGENCY DEPT VISIT HI MDM: CPT

## 2018-01-03 PROCEDURE — 85018 HEMOGLOBIN: CPT

## 2018-01-03 PROCEDURE — 93010 ELECTROCARDIOGRAM REPORT: CPT

## 2018-01-03 PROCEDURE — 85025 COMPLETE CBC W/AUTO DIFF WBC: CPT

## 2018-01-03 PROCEDURE — 85014 HEMATOCRIT: CPT

## 2018-01-03 PROCEDURE — 36415 COLL VENOUS BLD VENIPUNCTURE: CPT

## 2018-01-03 PROCEDURE — 96366 THER/PROPH/DIAG IV INF ADDON: CPT

## 2018-01-03 PROCEDURE — G0378 HOSPITAL OBSERVATION PER HR: HCPCS

## 2018-01-03 PROCEDURE — 86850 RBC ANTIBODY SCREEN: CPT

## 2018-01-03 PROCEDURE — 82962 GLUCOSE BLOOD TEST: CPT

## 2018-01-03 PROCEDURE — 93005 ELECTROCARDIOGRAM TRACING: CPT

## 2018-01-03 PROCEDURE — 86900 BLOOD TYPING SEROLOGIC ABO: CPT

## 2018-01-03 PROCEDURE — 96372 THER/PROPH/DIAG INJ SC/IM: CPT

## 2018-01-03 PROCEDURE — 84484 ASSAY OF TROPONIN QUANT: CPT

## 2018-01-03 PROCEDURE — 96365 THER/PROPH/DIAG IV INF INIT: CPT

## 2018-01-03 PROCEDURE — 80048 BASIC METABOLIC PNL TOTAL CA: CPT

## 2018-01-03 PROCEDURE — 71045 X-RAY EXAM CHEST 1 VIEW: CPT

## 2018-01-03 PROCEDURE — 82553 CREATINE MB FRACTION: CPT

## 2018-01-03 PROCEDURE — 82550 ASSAY OF CK (CPK): CPT

## 2018-01-03 PROCEDURE — 85520 HEPARIN ASSAY: CPT

## 2018-01-03 PROCEDURE — 86901 BLOOD TYPING SEROLOGIC RH(D): CPT

## 2018-01-03 PROCEDURE — C1894 INTRO/SHEATH, NON-LASER: HCPCS

## 2018-01-03 RX ADMIN — LACTOBACILLUS ACIDOPH-L.BULGARICUS 1 MILLION CELL CHEWABLE TABLET SCH EACH: at 21:55

## 2018-01-03 RX ADMIN — FENTANYL CITRATE ONE MCG: 50 INJECTION, SOLUTION INTRAMUSCULAR; INTRAVENOUS at 14:19

## 2018-01-03 RX ADMIN — FENTANYL CITRATE ONE MCG: 50 INJECTION, SOLUTION INTRAMUSCULAR; INTRAVENOUS at 14:21

## 2018-01-03 RX ADMIN — METOPROLOL TARTRATE SCH MG: 25 TABLET, FILM COATED ORAL at 21:56

## 2018-01-03 RX ADMIN — GABAPENTIN SCH MG: 300 CAPSULE ORAL at 21:56

## 2018-01-03 RX ADMIN — LISINOPRIL SCH MG: 20 TABLET ORAL at 21:55

## 2018-01-03 RX ADMIN — INSULIN ASPART SCH UNITS: 100 INJECTION, SOLUTION INTRAVENOUS; SUBCUTANEOUS at 21:56

## 2018-01-03 NOTE — XRAY REPORT
FINAL REPORT



PROCEDURE:  XR CHEST 1V AP



TECHNIQUE:  Chest radiograph anteroposterior view. CPT 65445







HISTORY:  chest pain         (in cath lab) 



COMPARISON:  No prior studies are available for comparison.



FINDINGS:  

Heart: Normal contour 



Mediastinum/Vessels: Normal contour.



Lungs/Pleural space: There are patchy bilateral lower lobe and

right upper lobe airspace opacities, which may be related to

infectious infiltrates or edema. No large effusion is seen. No

pneumothorax is identified.



Bony thorax: No acute osseous abnormality.



Life support devices: None.



IMPRESSION:  

Bilateral patchy airspace opacities may be related to edema or

infectious process.

## 2018-01-03 NOTE — EMERGENCY DEPARTMENT REPORT
ED Chest Pain HPI





- General


Chief Complaint: Chest Pain


Stated Complaint: STEMI


Time Seen by Provider: 01/03/18 13:35


Source: patient, EMS


Mode of arrival: Stretcher


Limitations: No Limitations





- History of Present Illness


Initial Comments: 


This is a 67-year-old American female presents to the emergency department by 

EMS from home with complaint of chest pain has been going on since 8 AM this 

morning.  It is a crushing chest pain sensation that is left sided.  The 

patient has some chronic intermittent chest pains but it worsened this morning.

  It is associated with some nausea and vomiting and some shortness of breath.  

She has a history of CHF, hypertension, hyperlipidemia, diabetes and coronary 

artery disease with previous MI and bypass surgery.  She follows with [facility] cardiology.  She received an aspirin in route.  She denies any tobacco or 

illicit drug use or abuse.








- Related Data


 Home Medications











 Medication  Instructions  Recorded  Confirmed  Last Taken


 


Clopidogrel [Plavix] 75 mg PO DAILY 12/09/14 10/16/15 05/13/16


 


Gabapentin 300 mg PO BID 12/09/14 10/16/15 05/18/16


 


Insulin NPH Hum/Reg Insulin Hm 45 units SQ BID 12/09/14 10/16/15 05/18/16





[HumuLIN 70-30 Vial]    


 


Ipratropium/Albuterol Sulfate 1 spray IH QID PRN 10/16/15 10/16/15 05/18/16





[Combivent Respimat]    


 


Lisinopril [Zestril TAB] 20 mg PO BID 10/16/15 10/16/15 05/18/16


 


traMADol [Ultram 50 MG tab] 50 mg PO PRN 10/16/15 10/16/15 05/18/16








 Previous Rx's











 Medication  Instructions  Recorded  Last Taken  Type


 


Nitroglycerin [Nitrostat] 0.4 mg SL Q5M #100 tab 12/18/14 03/01/16 Rx


 


Aspirin [Aspirin BABY CHEW TAB] 81 mg PO QDAY #30 tab.chew 12/19/14 05/15/16 Rx


 


Furosemide [Lasix TAB] 20 mg PO QDAY #30 tablet 12/19/14 05/18/16 Rx


 


Lactobacillus Acidophil [Lactinex] 1 each PO TID #30 tablet 10/21/15 05/18/16 Rx


 


Levofloxacin [Levaquin TAB] 750 mg PO QDAY #10 tablet 10/21/15 05/18/16 Rx


 


Metoprolol [Lopressor TAB] 12.5 mg PO BID #30 tablet 10/21/15 05/18/16 Rx


 


Sulfamethoxazole/Trimethoprim 1 each PO BID #20 tablet 10/21/15 05/18/16 Rx





[Bactrim DS TAB]    


 


metroNIDAZOLE [Flagyl TAB] 500 mg PO Q8H #30 tablet 10/21/15 05/18/16 Rx


 


predniSONE [Deltasone] 5 mg PO QDAY #14 tab 02/25/17 Unknown Rx


 


traMADol [Ultram 50 MG tab] 50 mg PO Q6HR PRN #20 tablet 02/25/17 Unknown Rx











 Allergies











Allergy/AdvReac Type Severity Reaction Status Date / Time


 


atorvastatin calcium Allergy  Unknown Verified 01/03/18 13:37





[From Lipitor]     


 


Iodinated Contrast- Oral and Allergy  Unknown Verified 01/03/18 13:37





IV Dye     





[Iodinated Contrast Media -     





IV Dye]     


 


Penicillins Allergy  Unknown Verified 01/03/18 13:37


 


tomato Allergy  Unknown Verified 01/03/18 13:37














Heart Score





- HEART Score


History: Highly suspicious


EKG: Significant ST-depression


Age: > 65


Risk factors: > 3 risk factors or hx of atherosclerotic disease


Troponin: > 3x normal limit


HEART Score: 10





- Critical Actions


Critical Actions: >7 pts:50-65% risk of adverse cardiac event. Early invasive 

measures





ED Review of Systems


ROS: 


Stated complaint: STEMI


Other details as noted in HPI





Comment: All other systems reviewed and negative


Constitutional: denies: chills, fever


Eyes: denies: eye pain, eye discharge, vision change


ENT: denies: ear pain, throat pain


Respiratory: shortness of breath.  denies: cough


Cardiovascular: chest pain.  denies: palpitations


Gastrointestinal: nausea, vomiting.  denies: abdominal pain


Genitourinary: denies: urgency, dysuria, discharge


Musculoskeletal: denies: back pain, joint swelling, arthralgia


Skin: denies: rash, lesions


Neurological: denies: headache, weakness, paresthesias





ED Past Medical Hx





- Past Medical History


Hx Hypertension: Yes


Hx CVA: Yes


Hx Heart Attack/AMI: Yes


Hx Congestive Heart Failure: Yes


Hx Diabetes: Yes


Hx GERD: Yes (GERD)


Hx Liver Disease: No


Hx Renal Disease: Yes (renal stones)


Hx Arthritis: Yes


Hx Kidney Stones: Yes


Hx Asthma: No


Hx COPD: Yes


Hx HIV: No





- Surgical History


Hx Coronary Stent: Yes


Hx Open Heart Surgery: Yes (CABG X2)


Additional Surgical History: Open heart





- Social History


Smoking Status: Never Smoker


Substance Use Type: None





- Medications


Home Medications: 


 Home Medications











 Medication  Instructions  Recorded  Confirmed  Last Taken  Type


 


Clopidogrel [Plavix] 75 mg PO DAILY 12/09/14 10/16/15 05/13/16 History


 


Gabapentin 300 mg PO BID 12/09/14 10/16/15 05/18/16 History


 


Insulin NPH Hum/Reg Insulin Hm 45 units SQ BID 12/09/14 10/16/15 05/18/16 

History





[HumuLIN 70-30 Vial]     


 


Nitroglycerin [Nitrostat] 0.4 mg SL Q5M #100 tab 12/18/14 10/16/15 03/01/16 Rx


 


Aspirin [Aspirin BABY CHEW TAB] 81 mg PO QDAY #30 tab.chew 12/19/14 10/16/15 05/

15/16 Rx


 


Furosemide [Lasix TAB] 20 mg PO QDAY #30 tablet 12/19/14 10/16/15 05/18/16 Rx


 


Ipratropium/Albuterol Sulfate 1 spray IH QID PRN 10/16/15 10/16/15 05/18/16 

History





[Combivent Respimat]     


 


Lisinopril [Zestril TAB] 20 mg PO BID 10/16/15 10/16/15 05/18/16 History


 


traMADol [Ultram 50 MG tab] 50 mg PO PRN 10/16/15 10/16/15 05/18/16 History


 


Lactobacillus Acidophil [Lactinex] 1 each PO TID #30 tablet 10/21/15  05/18/16 

Rx


 


Levofloxacin [Levaquin TAB] 750 mg PO QDAY #10 tablet 10/21/15  05/18/16 Rx


 


Metoprolol [Lopressor TAB] 12.5 mg PO BID #30 tablet 10/21/15  05/18/16 Rx


 


Sulfamethoxazole/Trimethoprim 1 each PO BID #20 tablet 10/21/15  05/18/16 Rx





[Bactrim DS TAB]     


 


metroNIDAZOLE [Flagyl TAB] 500 mg PO Q8H #30 tablet 10/21/15  05/18/16 Rx


 


predniSONE [Deltasone] 5 mg PO QDAY #14 tab 02/25/17  Unknown Rx


 


traMADol [Ultram 50 MG tab] 50 mg PO Q6HR PRN #20 tablet 02/25/17  Unknown Rx














ED Physical Exam





- General


Limitations: No Limitations





- Other


Other exam information: 


GENERAL: The patient is well-developed well-nourished.


HENT: Normocephalic.  Atraumatic.    Patient has moist mucous membranes.


EYES: Extraocular motions are intact.  Pupils equal reactive to light 

bilaterally.


NECK: Supple.  Trachea is midline.


CHEST/LUNGS: Clear to auscultation.  There is no respiratory distress noted.


HEART/CARDIOVASCULAR: Regular.  There is no tachycardia.  There is no murmur.


ABDOMEN: Abdomen is soft, nontender.  Patient has normal bowel sounds.  There 

is no abdominal distention.


SKIN: Skin is warm and dry.


NEURO: The patient is awake, alert, and oriented.  The patient is cooperative.  

The patient has no focal neurologic deficits.  The patient has normal speech.


MUSCULOSKELETAL: There is no tenderness or deformity.  There is no limitation 

range of motion.  There is no evidence of acute injury.








ED Course


 Vital Signs











  01/03/18 01/03/18 01/03/18





  13:38 15:06 15:15


 


Temperature 97.8 F 97.6 F 


 


Pulse Rate 58 L 52 L 54 L


 


Respiratory 20 11 L 10 L





Rate   


 


Blood Pressure 175/79 120/60 138/65


 


O2 Sat by Pulse 96 96 94





Oximetry   














  01/03/18 01/03/18 01/03/18





  15:20 15:25 15:30


 


Temperature   


 


Pulse Rate 54 L 53 L 53 L


 


Respiratory 10 L 11 L 11 L





Rate   


 


Blood Pressure 138/65 153/75 153/75


 


O2 Sat by Pulse 94 93 93





Oximetry   














  01/03/18 01/03/18





  15:45 16:59


 


Temperature  


 


Pulse Rate 57 L 58 L


 


Respiratory 14 





Rate  


 


Blood Pressure 157/58 145/75


 


O2 Sat by Pulse 94 89





Oximetry  














- Consultations


Consultation #1: 





01/03/18 13:44


Dr Quigley happens to be in the ED when Code STEMI called. He is unsure if EKG 

looks like a true STEMI but plans to take her to the cath lab immediately. 





JOSE score





- Jose Score


Age > 65: (1) Yes


Aspirin use within the Past 7 Days: (0) No


3 or more CAD Risk Factors: (1) Yes


2 or more Angina events in past 24 hrs: (1) Yes


Known CAD with more than 50% Stenosis: (0) No


Elevated Cardiac Markers: (1) Yes


ST Deviation Greater than 0.5mm: (1) Yes


JOSE Score: 5





ED Medical Decision Making





- Lab Data


Result diagrams: 


 01/03/18 17:19





 01/03/18 13:32





- EKG Data


-: EKG Interpreted by Me


EKG shows normal: sinus rhythm, axis (left axis deviation), intervals (

prolonged QTC), QRS complexes (LVH, inferior Q waves), ST-T waves (there is 

some ST elevation to the inferior leads with very mild anterior depression)


Rate: normal





- EKG Data


When compared to previous EKG there are: changes noted (previous inferior q 

waves unchanged, but concerning for acute STEMI)


Interpretation: acute MI





- Radiology Data


Radiology results: image reviewed


interpreted by me: 


Chest x-ray does not show any acute process.  There are no pleural effusions, 

obvious pneumonia and there is no pneumothorax.








- Medical Decision Making


The patient presented with acute on chronic chest pain but describes the chest 

pain this morning as being much more intense and "crushing."  EKG showed 

concern for inferior wall ST elevation MI.  Code STEMI was called and the 

patient was taken to Cath Lab.  It does not appears that they saw any 

significant vessel disease that required stenting or ballooning.  First 

troponin was negative but the second troponin was elevated at 0.17.  Patient 

admitted by the cardiology service.








- Differential Diagnosis


STEMI, NSTEMI, PE, Pneumonia


Critical Care Time: No


Critical care attestation.: 


If time is entered above; I have spent that time in minutes in the direct care 

of this critically ill patient, excluding procedure time.








ED Disposition


Clinical Impression: 


Myocardial infarction


Qualifiers:


 Myocardial infarction ST status: ST elevation myocardial infarction Involved 

coronary artery: unspecified coronary artery Qualified Code(s): I21.3 - ST 

elevation (STEMI) myocardial infarction of unspecified site





CAD (coronary artery disease)


Qualifiers:


 Coronary Disease-Associated Artery/Lesion type: unspecified vessel or lesion 

type Native vs. transplanted heart: native heart Associated angina: angina 

presence unspecified Qualified Code(s): I25.10 - Atherosclerotic heart disease 

of native coronary artery without angina pectoris





Chest pain


Qualifiers:


 Chest pain type: chest pain due to myocardial ischemia 





Disposition: [id number] OP ADMIT IP TO THIS HOSP


Is pt being admited?: Yes


Condition: Fair


Time of Disposition: 19:14

## 2018-01-03 NOTE — HISTORY AND PHYSICAL REPORT
History of Present Illness


Date of examination: 01/03/18


Date of admission: 


1/3/18   


Chief complaint: 


chest pain 


History of present illness: 


The patient is a 67 year old female who is followed by Dr. Short in the office 

with a history of CAD s/p CABG, HTN, DM, HLD who presented with complaints of 

chest pain ongoing since yesterday but worse today. It is a crushing chest pain 

sensation that is left sided. It is associated with some nausea and vomiting 

and some shortness of breath. EKG showed sinus rhythm with inferior ST 

elevation and mild anterior depression. STEMI protocol was initiated at the 

patient has been taken to the cath lab by Dr. Quigley on an emergent basis.











Past History


Past Medical History: CAD, diabetes, hypertension, hyperlipidemia


Past Surgical History: CABG


Social history: denies: smoking, alcohol abuse, prescription drug abuse


Family history: no significant family history





Medications and Allergies


 Allergies











Allergy/AdvReac Type Severity Reaction Status Date / Time


 


atorvastatin calcium Allergy  Unknown Verified 01/03/18 13:37





[From Lipitor]     


 


Iodinated Contrast- Oral and Allergy  Unknown Verified 01/03/18 13:37





IV Dye     





[Iodinated Contrast Media -     





IV Dye]     


 


Penicillins Allergy  Unknown Verified 01/03/18 13:37


 


tomato Allergy  Unknown Verified 01/03/18 13:37











 Home Medications











 Medication  Instructions  Recorded  Confirmed  Last Taken  Type


 


Clopidogrel [Plavix] 75 mg PO DAILY 12/09/14 10/16/15 05/13/16 History


 


Gabapentin 300 mg PO BID 12/09/14 10/16/15 05/18/16 History


 


Insulin NPH Hum/Reg Insulin Hm 45 units SQ BID 12/09/14 10/16/15 05/18/16 

History





[HumuLIN 70-30 Vial]     


 


Nitroglycerin [Nitrostat] 0.4 mg SL Q5M #100 tab 12/18/14 10/16/15 03/01/16 Rx


 


Aspirin [Aspirin BABY CHEW TAB] 81 mg PO QDAY #30 tab.chew 12/19/14 10/16/15 05/

15/16 Rx


 


Furosemide [Lasix TAB] 20 mg PO QDAY #30 tablet 12/19/14 10/16/15 05/18/16 Rx


 


Ipratropium/Albuterol Sulfate 1 spray IH QID PRN 10/16/15 10/16/15 05/18/16 

History





[Combivent Respimat]     


 


Lisinopril [Zestril TAB] 20 mg PO BID 10/16/15 10/16/15 05/18/16 History


 


traMADol [Ultram 50 MG tab] 50 mg PO PRN 10/16/15 10/16/15 05/18/16 History


 


Lactobacillus Acidophil [Lactinex] 1 each PO TID #30 tablet 10/21/15  05/18/16 

Rx


 


Levofloxacin [Levaquin TAB] 750 mg PO QDAY #10 tablet 10/21/15  05/18/16 Rx


 


Metoprolol [Lopressor TAB] 12.5 mg PO BID #30 tablet 10/21/15  05/18/16 Rx


 


Sulfamethoxazole/Trimethoprim 1 each PO BID #20 tablet 10/21/15  05/18/16 Rx





[Bactrim DS TAB]     


 


metroNIDAZOLE [Flagyl TAB] 500 mg PO Q8H #30 tablet 10/21/15  05/18/16 Rx


 


predniSONE [Deltasone] 5 mg PO QDAY #14 tab 02/25/17  Unknown Rx


 


traMADol [Ultram 50 MG tab] 50 mg PO Q6HR PRN #20 tablet 02/25/17  Unknown Rx











Active Meds: 


Active Medications





Heparin Sodium (Porcine) (Heparin 10,000 Units/10 Ml)  4,000 unit IV ONCE ONE


   Stop: 01/03/18 14:01


   Last Admin: 01/03/18 13:44 Dose:  4,000 unit


Sodium Chloride (Nacl 0.9% 1000 Ml)  1,000 mls @ 42 mls/hr IV ONCE ONE


   Stop: 01/04/18 13:23


Heparin Sodium/Sodium Chloride (Heparin/ 0.45% Nacl-25,000 Unit/500 Ml)  25,000 

unit in 500 mls @ 15 mls/hr IV TITRATE SANDEEP; 750 UNITS/HR


   PRN Reason: Protocol


Sodium Chloride (Nacl 0.9% 1000 Ml)  1,000 mls @ 42 mls/hr IV ONCE ONE


   Stop: 01/04/18 13:25











Review of Systems


Constitutional: no fever, no chills


Ears, nose, mouth and throat: no nasal congestion, no nasal discharge, no sinus 

pressure


Cardiovascular: chest pain, shortness of breath


Respiratory: shortness of breath, no cough, no congestion, no wheezing


Gastrointestinal: nausea, vomiting


Genitourinary Female: no dysuria, no urgency


Musculoskeletal: no neck stiffness, no neck pain, no myalgias


Integumentary: no rash, no pruritis


Neurological: no parathesias, no numbness, no tingling


Endocrine: no cold intolerance, no heat intolerance


Hematologic/Lymphatic: no easy bruising, no easy bleeding


Allergic/Immunologic: no urticaria, no wheezing





Physical Examination


 Vital Signs











Temp Pulse Resp BP Pulse Ox


 


 97.8 F   58 L  20   175/79   96 


 


 01/03/18 13:38  01/03/18 13:38  01/03/18 13:38  01/03/18 13:38  01/03/18 13:38











HEENT: Positive: PERRL, Normocephaly, Mucus Membranes Moist


Neck: Positive: neck supple, trachea midline


Cardiac: Positive: Reg Rate and Rhythm, S1/S2


Lungs: Positive: clear to auscultation


Neuro: Positive: Grossly Intact


Abdomen: Positive: Soft, Active Bowel Sounds.  Negative: Tender


Skin: Positive: Clear.  Negative: Rash


Extremities: Present: normal.  Absent: edema





Results





 01/03/18 13:32





 01/03/18 13:32


 CBC











  01/03/18 Range/Units





  13:32 


 


WBC  7.2  (4.5-11.0)  K/mm3


 


RBC  5.15 H  (3.65-5.03)  M/mm3


 


Hgb  13.5  (10.1-14.3)  gm/dl


 


Hct  41.8  (30.3-42.9)  %


 


Plt Count  172  (140-440)  K/mm3


 


Lymph #  2.1  (1.2-5.4)  K/mm3


 


Mono #  0.7  (0.0-0.8)  K/mm3


 


Eos #  0.1  (0.0-0.4)  K/mm3


 


Baso #  0.0  (0.0-0.1)  K/mm3














- Imaging and Cardiology


EKG: image reviewed





EKG interpretations





- Telemetry


EKG Rhythm: Sinus Rhythm





- EKG


Sinus rhythms and dysrhythmias: sinus rhythm


Myocardial infarction: inferior MI (acute or rec, inferior MI (old age inde





Assessment and Plan


Assessment:





Inferior STEMI


CAD s/p CABG


Hypertension


Hyperlipidemia


Diabetes








Plan:





Await emergent left heart cath findings. Further recommendations to follow.








The patient has been seen in conjunction with Dr. Quigley who agrees with the 

assessment and plan of care.

## 2018-01-03 NOTE — CARDIAC CATHERIZATION REPORT
INDICATION FOR PROCEDURE:  The patient with known coronary artery disease with

single vessel bypass and LV mass excision done in 1995, was so at [place], had a

catheterization done in 2014, with chronically occluded RCA noted with patent

graft to the marginal branch in 2014.  She presently presents with chest pain on

and off of 24 hours duration, worse this morning.  EKG showed Q-waves in the

inferior leads along with mild ST elevations.  Hence, she being brought to the

catheterization laboratory for evaluation of her persistent chest pain more than

24 hours duration with diffuse ST-T changes with old inferior MI and minimally

elevated STs in the inferior leads.  The patient's pain is improved after she

came to the Emergency Room.  Because of her intermittent chest pain, it was felt

that the patient would benefit from cardiac catheterization from definitive

diagnosis and treatment.  The patient is aware of the procedure, potential

complications and alternatives of therapy available.



DESCRIPTION OF PROCEDURE:  The patient was brought to the catheterization

laboratory on an urgent basis.  Both the groins were prepared.  She has surgical

scars in both the groins with a lot of scarring noted from the previous cardiac

catheterization.  Using 5-[place] micropuncture needle, right femoral artery

puncture was made and 6-[place] sheath was introduced.  After confirming the

guidewire was in the true lumen, 6-[place] sheath was introduced and subsequently

using JR4 catheter, right coronary angiograms were obtained.  Multipurpose

catheter was used to obtain the angiograms of the left ventricle done in POLK and

German projection.  A JL4 catheter was used to obtain the angiograms of the left

coronary artery and left coronary bypass catheter was used to obtain the

angiograms of the vein graft to the marginal branch.  Following findings were

noted.  

Left ventriculogram done in POLK projection and German projection using hand

injection showed marked hypokinesis of the anterior wall and inferior wall. 

Overall, ejection fraction was felt to be around 25-30%.  There is akinesis of

the inferolateral wall, and also the anterior wall is markedly hypokinetic to

kinetic.  

Right coronary artery shows patent RCA; however, there is lucency in

the proximal RCA suggestive of dissection and also in the mid part, RCA divides

into 2 vessels, the large RV a branch is patent with patent stent in the

mid part.  However, this is diffusely diseased distally, the distal vessel being

small caliber, less than 1 mm.  Posterior branch of the artery is occluded. 

This  is filling retrograde on LCA injection.



Left coronary artery shows left main without significant disease.  LAD shows

severe diffuse disease in the mid part and with 80-90% long lesion in the mid

part.  Distal vessel also a small caliber, but patent.  Circumflex artery shows

occlusion in the proximal part with faint  visualization of the marginal

branches distally.  Vein graft to the obtuse marginal branch shows severe lesion

proximally, patent distally, but this is attached to the marginal branch, which

has filling defects throughout suggestive of dissection versus thrombus.  Distal

vessel is filling slowly.



FINAL IMPRESSION:  Moderate-to-severe left ventricular dysfunction with marked

hypokinesis of the anterior wall and inferior wall and inferolateral wall. 

Ejection fraction is around 25% or so.  However, only hand injection was

performed.  We will get an echocardiogram.



The patient has severe triple vessel disease with occluded RCA in the mid part

with suggestion of dissection in the proximal vessel.  This is very small

caliber distal vessel.  Also, an LCA injection, there is collateral filling to

the LV branches of the RCA.  Circumflex artery is occluded in the proximal part

with patent graft to the obtuse marginal branch; however, the graft itself has

severe lesion along with a lot of thrombus throughout in the marginal branch. 

Left main is patent without significant disease; however, LAD has severe chronic

disease in the mid part.  Distal vessel is small caliber, but it is patent.



At this time, these findings have progressed in the last 4 years.  Considering

the LV dysfunction with above-mentioned severe triple vessel disease,

consideration will be given for revascularization surgically.  However, we will

get cardiothoracic surgeon opinion regarding this. 

The patient is chest pain free.  Blood pressure was stable around 130-160

systolic, rhythm being sinus.  We will be continued on IV heparin.  We will be

continued on antianginal medications.  We will plan for transfer to [facility] after discussion with cardiothoracic surgeon.



I had a long discussion with the patient's son.  He tells me that the patient

was recently hospitalized with heart failure at [facility] and she

was evaluated at [facility].



The patient's procedure is uncomplicated.  Good hemostasis was achieved with

pressure bandage.





DD: 01/03/2018 15:40

DT: 01/03/2018 22:21

JOB# [id number]  [id number]

RHYS/BRIAN CORDERO

## 2018-01-04 VITALS — SYSTOLIC BLOOD PRESSURE: 128 MMHG | DIASTOLIC BLOOD PRESSURE: 57 MMHG

## 2018-01-04 RX ADMIN — TRAMADOL HYDROCHLORIDE PRN MG: 50 TABLET, COATED ORAL at 09:27

## 2018-01-04 RX ADMIN — GABAPENTIN SCH MG: 300 CAPSULE ORAL at 09:25

## 2018-01-04 RX ADMIN — INSULIN ASPART SCH UNITS: 100 INJECTION, SOLUTION INTRAVENOUS; SUBCUTANEOUS at 09:27

## 2018-01-04 RX ADMIN — TRAMADOL HYDROCHLORIDE PRN MG: 50 TABLET, COATED ORAL at 15:40

## 2018-01-04 RX ADMIN — METOPROLOL TARTRATE SCH: 25 TABLET, FILM COATED ORAL at 10:52

## 2018-01-04 RX ADMIN — LACTOBACILLUS ACIDOPH-L.BULGARICUS 1 MILLION CELL CHEWABLE TABLET SCH EACH: at 13:02

## 2018-01-04 RX ADMIN — LISINOPRIL SCH MG: 20 TABLET ORAL at 09:25

## 2018-01-04 RX ADMIN — INSULIN ASPART SCH UNITS: 100 INJECTION, SOLUTION INTRAVENOUS; SUBCUTANEOUS at 12:55

## 2018-01-04 RX ADMIN — LACTOBACILLUS ACIDOPH-L.BULGARICUS 1 MILLION CELL CHEWABLE TABLET SCH EACH: at 09:25

## 2018-01-04 NOTE — DISCHARGE SUMMARY
Providers





- Providers


Date of Admission: 


01/03/18 15:12





Date of discharge: 01/04/18


Attending physician: 


SHELIA MCDONNELL





 





01/03/18 15:12


Consult to Cardiac Rehabilitation [CONS] Routine 


   Reason For Exam: Cardiac Rehab Evaluation











Primary care physician: 


PRIMARY CARE MD








Hospitalization


Reason for admission: STEMI


Condition: Fair


Pertinent studies: 


emergent left heart cath


Hospital course: 


The patient is a 67 year old female who is followed by Dr. Short in the office 

with a history of known CAD s/p CABG and LV mass excision in 1996 who had a 

cath done it 2014 which revealed a chronically occluded RCA with patient graft 

to the marginal branch who presented on 1/3/18 with chest pain on and off for 

the past 24 hours. EKG showed inferior Q-waves and mild inferior ST elevation. 

STEMI protocol was initiated at the patient was taken to the cath lab by Dr. Mcdonnell on an emergent basis. Left heart cath revealed triple vessel CAD and 

moderate to severe LV dysfunction with marked hypokinesis of the anterior, 

inferior and inferolateral wall, EF around 25%. She was chest pain free 

following the procedure and admitted to telemetry for observation overnight. 

She will be transferred to [facility] in stable condition this afternoon 

under the care of Dr. Cortez for consideration of bypass surgery. 


Disposition: [id number] ANOTHER TYPE HLTHCARE





- Discharge Diagnoses


(1) STEMI (ST elevation myocardial infarction)


Status: Acute   





(2) Cardiomyopathy


Status: Acute   





(3) CAD (coronary artery disease)


Status: Chronic   


Qualifiers: 


   Coronary Disease-Associated Artery/Lesion type: unspecified vessel or lesion 

type   Native vs. transplanted heart: native heart   Associated angina: angina 

presence unspecified   Qualified Code(s): I25.10 - Atherosclerotic heart 

disease of native coronary artery without angina pectoris   





(4) Hx of CABG


Status: Chronic   





(5) Hypertension


Status: Chronic   





(6) Hyperlipidemia


Status: Chronic   





(7) Diabetes


Status: Chronic   





Core Measure Documentation





- Palliative Care


Palliative Care/ Comfort Measures: Not Applicable





- Core Measures


Any of the following diagnoses?: acute MI





- Acute MI Discharge Requirements


Aspirin at discharge: Yes


ACE/ARB for LVSD if EF <40%: Yes


Beta blocker at discharge: Yes


Statin for LDL = or >100 mg/dl on DC: Yes





Exam





- Constitutional


Vitals: 


 











Temp Pulse Resp BP Pulse Ox


 


 98.7 F   55 L  16   136/60   88 


 


 01/04/18 07:22  01/04/18 09:25  01/04/18 07:22  01/04/18 09:25  01/04/18 07:22











General appearance: Present: no acute distress





- EENT


Eyes: Present: PERRL, EOM intact


ENT: hearing intact





- Neck


Neck: Present: supple, normal ROM





- Respiratory


Respiratory effort: normal


Respiratory: bilateral: CTA





- Cardiovascular


Rhythm: regular


Heart Sounds: Present: S1 & S2





- Extremities


Extremities: no ischemia, pulses intact


Peripheral Pulses: within normal limits





- Abdominal


General gastrointestinal: Present: soft, non-tender





- Integumentary


Integumentary: Present: clear, warm, dry





- Psychiatric


Psychiatric: appropriate mood/affect





- Neurologic


Neurologic: CNII-XII intact





- Additional findings


Additional findings: 


right femoral cath site-no hematoma





Plan


Follow up with: 


PRIMARY CARE,MD [Primary Care Provider] - 7 Days

## 2018-01-04 NOTE — PROGRESS NOTE
Assessment and Plan


Assessment:





Inferior STEMI-->[facility] revealed triple vessel CAD, EF 25%


CAD s/p CABG


Hypertension


Hyperlipidemia


Diabetes








Plan:





Continue heparin infusion. Await transfer to [facility] for CT surgery evaluation 

under the care of Dr. Cortez. 








The patient has been seen in conjunction with Dr. Quigley who agrees with the 

assessment and plan of care. 





Subjective


Date of service: 01/04/18


Principal diagnosis: STEMI


Interval history: 


The patient is resting in bed. Denies chest pain. 





Objective


 


 Last Vital Signs











Temp  97.9 F   01/04/18 10:59


 


Pulse  57 L  01/04/18 10:59


 


Resp  24   01/04/18 10:59


 


BP  128/57   01/04/18 10:59


 


Pulse Ox  93   01/04/18 10:59

















- Physical Examination


General: No Apparent Distress


HEENT: Positive: PERRL, Normocephaly, Mucus Membranes Moist


Neck: Positive: neck supple, trachea midline


Cardiac: Positive: Reg Rate and Rhythm, S1/S2


Lungs: Positive: clear to auscultation


Neuro: Positive: Grossly Intact


Abdomen: Positive: Soft, Active Bowel Sounds.  Negative: Tender


Skin: Positive: Clear.  Negative: Rash


Extremities: Present: normal.  Absent: edema





- Labs and Meds


 Cardiac Enzymes











  01/03/18 01/03/18 01/03/18 Range/Units





  13:32 17:19 20:47 


 


CK-MB (CK-2)  4.4 H  23.6 H  23.5 H  (0.0-4.0)  ng/mL








 Coagulation











  01/03/18 01/03/18 Range/Units





  13:32 17:19 


 


PT  12.2  12.6  (12.2-14.9)  Sec.


 


INR  0.87  0.90  (0.87-1.13)  


 


APTT  25.7  60.3 H*  (24.2-36.6)  Sec.








 Lipids











  01/03/18 Range/Units





  17:19 


 


Triglycerides  159 H  (2-149)  mg/dL


 


Cholesterol  262 H  ()  mg/dL


 


HDL Cholesterol  57  (40-59)  mg/dL


 


Cholesterol/HDL Ratio  4.59  %








 CBC











  01/03/18 01/03/18 Range/Units





  13:32 17:19 


 


WBC  7.2   (4.5-11.0)  K/mm3


 


RBC  5.15 H   (3.65-5.03)  M/mm3


 


Hgb  13.5  14.1  (10.1-14.3)  gm/dl


 


Hct  41.8  44.2 H  (30.3-42.9)  %


 


Plt Count  172  177  (140-440)  K/mm3


 


Lymph #  2.1   (1.2-5.4)  K/mm3


 


Mono #  0.7   (0.0-0.8)  K/mm3


 


Eos #  0.1   (0.0-0.4)  K/mm3


 


Baso #  0.0   (0.0-0.1)  K/mm3








 Comprehensive Metabolic Panel











  01/03/18 Range/Units





  13:32 


 


Sodium  135 L  (137-145)  mmol/L


 


Potassium  4.6  (3.6-5.0)  mmol/L


 


Chloride  94.3 L  ()  mmol/L


 


Carbon Dioxide  27  (22-30)  mmol/L


 


BUN  17  (7-17)  mg/dL


 


Creatinine  1.2  (0.7-1.2)  mg/dL


 


Glucose  372 H  ()  mg/dL


 


Calcium  8.7  (8.4-10.2)  mg/dL














- Imaging and Cardiology


EKG: image reviewed


Echo: pending





- Telemetry


EKG Rhythm: Sinus Rhythm





- EKG


Sinus rhythms and dysrhythmias: sinus rhythm


Myocardial infarction: inferior MI (acute or rec, inferior MI (old age inde

## 2018-04-26 ENCOUNTER — HOSPITAL ENCOUNTER (EMERGENCY)
Dept: HOSPITAL 5 - ED | Age: 68
LOS: 1 days | Discharge: HOME | End: 2018-04-27
Payer: MEDICARE

## 2018-04-26 DIAGNOSIS — E11.22: ICD-10-CM

## 2018-04-26 DIAGNOSIS — I20.9: Primary | ICD-10-CM

## 2018-04-26 DIAGNOSIS — Z91.018: ICD-10-CM

## 2018-04-26 DIAGNOSIS — Z88.0: ICD-10-CM

## 2018-04-26 DIAGNOSIS — Z95.1: ICD-10-CM

## 2018-04-26 DIAGNOSIS — I13.0: ICD-10-CM

## 2018-04-26 DIAGNOSIS — N18.9: ICD-10-CM

## 2018-04-26 DIAGNOSIS — Z79.4: ICD-10-CM

## 2018-04-26 DIAGNOSIS — E87.6: ICD-10-CM

## 2018-04-26 DIAGNOSIS — J44.9: ICD-10-CM

## 2018-04-26 DIAGNOSIS — Z79.82: ICD-10-CM

## 2018-04-26 LAB
BASOPHILS # (AUTO): 0 K/MM3 (ref 0–0.1)
BASOPHILS NFR BLD AUTO: 0.5 % (ref 0–1.8)
EOSINOPHIL # BLD AUTO: 0.1 K/MM3 (ref 0–0.4)
EOSINOPHIL NFR BLD AUTO: 1.4 % (ref 0–4.3)
HCT VFR BLD CALC: 36.9 % (ref 30.3–42.9)
HGB BLD-MCNC: 11.9 GM/DL (ref 10.1–14.3)
LYMPHOCYTES # BLD AUTO: 1.4 K/MM3 (ref 1.2–5.4)
LYMPHOCYTES NFR BLD AUTO: 22.8 % (ref 13.4–35)
MCH RBC QN AUTO: 27 PG (ref 28–32)
MCHC RBC AUTO-ENTMCNC: 32 % (ref 30–34)
MCV RBC AUTO: 83 FL (ref 79–97)
MONOCYTES # (AUTO): 0.3 K/MM3 (ref 0–0.8)
MONOCYTES % (AUTO): 4.4 % (ref 0–7.3)
PLATELET # BLD: 171 K/MM3 (ref 140–440)
RBC # BLD AUTO: 4.44 M/MM3 (ref 3.65–5.03)

## 2018-04-26 PROCEDURE — 93010 ELECTROCARDIOGRAM REPORT: CPT

## 2018-04-26 PROCEDURE — 80061 LIPID PANEL: CPT

## 2018-04-26 PROCEDURE — 80053 COMPREHEN METABOLIC PANEL: CPT

## 2018-04-26 PROCEDURE — 71045 X-RAY EXAM CHEST 1 VIEW: CPT

## 2018-04-26 PROCEDURE — 85025 COMPLETE CBC W/AUTO DIFF WBC: CPT

## 2018-04-26 PROCEDURE — 84484 ASSAY OF TROPONIN QUANT: CPT

## 2018-04-26 PROCEDURE — 36415 COLL VENOUS BLD VENIPUNCTURE: CPT

## 2018-04-26 PROCEDURE — 93005 ELECTROCARDIOGRAM TRACING: CPT

## 2018-04-26 NOTE — XRAY REPORT
FINAL REPORT



PROCEDURE:  XR CHEST 1V AP



TECHNIQUE:  Chest radiograph anteroposterior view. CPT 29814



HISTORY:  Chest Pain 



COMPARISON:  No prior studies are available for comparison.



FINDINGS:  

Mild cardiomegaly is noted with mild bilateral pleural effusions

and pulmonary venous congestion. Diffuse prominence of

interstitial markings is noted. 



IMPRESSION:  

Findings are consistent with CHF with mild bilateral pleural

effusions.

## 2018-04-27 VITALS — SYSTOLIC BLOOD PRESSURE: 170 MMHG | DIASTOLIC BLOOD PRESSURE: 96 MMHG

## 2018-04-27 LAB
ALBUMIN SERPL-MCNC: 2.6 G/DL (ref 3.9–5)
ALT SERPL-CCNC: 11 UNITS/L (ref 7–56)
BUN SERPL-MCNC: 5 MG/DL (ref 7–17)
BUN/CREAT SERPL: 8 %
CALCIUM SERPL-MCNC: 8.1 MG/DL (ref 8.4–10.2)
HDLC SERPL-MCNC: 57 MG/DL (ref 40–59)
HEMOLYSIS INDEX: 6

## 2018-04-27 NOTE — EMERGENCY DEPARTMENT REPORT
ED General Adult HPI





- General


Chief complaint: Chest Pain


Stated complaint: CP


Time Seen by Provider: 04/26/18 21:53


Source: patient, EMS


Mode of arrival: Stretcher


Limitations: No Limitations





- History of Present Illness


Initial comments: 





Ms. Buck is a 69 yo female with hx of CAD, insulin-dependent diabetes COPD 

congestive heart failure history of CABG presents with chest pain & shortness 

of breath.  She described a "pulling sensation" which lasted 30 minutes.  She 

has a Nitropatch on constantly.  She takes isosorbide dinitrate.  Son states 

that normally after receiving a breathing treatment and prednisone her symptoms 

improves.  However this episode was a little bit more severe than normal.  Son 

desired evaluation.  Patient states she wants to go home.  She denies any pain 

at this time.  Son explains that due to frequent urination, he has held 

diuretic.





- Related Data


 Home Medications











 Medication  Instructions  Recorded  Confirmed  Last Taken


 


Gabapentin 300 mg PO BID 12/09/14 02/18/18 05/18/16








 Previous Rx's











 Medication  Instructions  Recorded  Last Taken  Type


 


Aspirin [Aspirin BABY CHEW TAB] 81 mg PO QDAY #30 tab.chew 02/22/18 Unknown Rx


 


Carvedilol [Coreg] 12.5 mg PO BID #60 tablet 02/22/18 Unknown Rx


 


Clopidogrel [Plavix] 75 mg PO DAILY #30 tablet 02/22/18 Unknown Rx


 


Furosemide [Lasix TAB] 20 mg PO QDAY #30 tablet 02/22/18 Unknown Rx


 


Insulin NPH Hum/Reg Insulin Hm 30 units SQ BID PRN #90 vial 02/22/18 Unknown Rx





[Humulin 70-30 Vial]    


 


Ipratropium/Albuterol Sulfate 1 spray IH QID PRN #60 mist.inhal 02/22/18 

Unknown Rx





[Combivent Respimat]    


 


Levofloxacin [Levaquin TAB] 750 mg PO QDAY #10 tablet 02/22/18 Unknown Rx


 


Lisinopril [Zestril TAB] 20 mg PO BID #60 tablet 02/22/18 Unknown Rx


 


Metoprolol [Lopressor TAB] 12.5 mg PO BID #30 tablet 02/22/18 Unknown Rx


 


Pravastatin [Pravachol] 80 mg PO QHS #30 tablet 02/22/18 Unknown Rx


 


metroNIDAZOLE [Flagyl TAB] 500 mg PO Q8H #30 tablet 02/22/18 Unknown Rx


 


predniSONE [Deltasone] 5 mg PO QDAY #14 tab 02/22/18 Unknown Rx


 


traMADol [Ultram 50 MG tab] 50 mg PO BID #60 tablet 02/22/18 Unknown Rx


 


Potassium Chloride 2 tab PO DAILY 14 Days #28 04/27/18 Unknown Rx





 tablet.er   











 Allergies











Allergy/AdvReac Type Severity Reaction Status Date / Time


 


atorvastatin calcium Allergy  Unknown Verified 01/03/18 13:37





[From Lipitor]     


 


Iodinated Contrast- Oral and Allergy  Unknown Verified 01/03/18 13:37





IV Dye     





[Iodinated Contrast Media -     





IV Dye]     


 


Penicillins Allergy  Unknown Verified 01/03/18 13:37


 


tomato Allergy  Unknown Verified 01/03/18 13:37














ED Review of Systems


ROS: 


Stated complaint: CP


Other details as noted in HPI





Comment: All other systems reviewed and negative


Constitutional: denies: fever, malaise


Respiratory: denies: cough





ED Past Medical Hx





- Past Medical History


Previous Medical History?: Yes


Hx Hypertension: Yes


Hx CVA: Yes


Hx Heart Attack/AMI: Yes


Hx Congestive Heart Failure: Yes


Hx Diabetes: Yes


Hx GERD: Yes (GERD)


Hx Liver Disease: No


Hx Renal Disease: Yes (renal stones)


Hx Arthritis: Yes


Hx Kidney Stones: Yes


Hx Asthma: No


Hx COPD: Yes


Hx HIV: No





- Surgical History


Past Surgical History?: Yes


Hx Coronary Stent: Yes


Hx Open Heart Surgery: Yes (CABG X2)


Additional Surgical History: Open heart





- Social History


Smoking Status: Never Smoker


Substance Use Type: None





- Medications


Home Medications: 


 Home Medications











 Medication  Instructions  Recorded  Confirmed  Last Taken  Type


 


Gabapentin 300 mg PO BID 12/09/14 02/18/18 05/18/16 History


 


Aspirin [Aspirin BABY CHEW TAB] 81 mg PO QDAY #30 tab.chew 02/22/18  Unknown Rx


 


Carvedilol [Coreg] 12.5 mg PO BID #60 tablet 02/22/18  Unknown Rx


 


Clopidogrel [Plavix] 75 mg PO DAILY #30 tablet 02/22/18  Unknown Rx


 


Furosemide [Lasix TAB] 20 mg PO QDAY #30 tablet 02/22/18  Unknown Rx


 


Insulin NPH Hum/Reg Insulin Hm 30 units SQ BID PRN #90 vial 02/22/18  Unknown Rx





[Humulin 70-30 Vial]     


 


Ipratropium/Albuterol Sulfate 1 spray IH QID PRN #60 mist.inhal 02/22/18  

Unknown Rx





[Combivent Respimat]     


 


Levofloxacin [Levaquin TAB] 750 mg PO QDAY #10 tablet 02/22/18  Unknown Rx


 


Lisinopril [Zestril TAB] 20 mg PO BID #60 tablet 02/22/18  Unknown Rx


 


Metoprolol [Lopressor TAB] 12.5 mg PO BID #30 tablet 02/22/18  Unknown Rx


 


Pravastatin [Pravachol] 80 mg PO QHS #30 tablet 02/22/18  Unknown Rx


 


metroNIDAZOLE [Flagyl TAB] 500 mg PO Q8H #30 tablet 02/22/18  Unknown Rx


 


predniSONE [Deltasone] 5 mg PO QDAY #14 tab 02/22/18  Unknown Rx


 


traMADol [Ultram 50 MG tab] 50 mg PO BID #60 tablet 02/22/18  Unknown Rx


 


Potassium Chloride 2 tab PO DAILY 14 Days #28 04/27/18  Unknown Rx





 tablet.er    














ED Physical Exam





- General


Limitations: No Limitations


General appearance: alert, in no apparent distress, other (frail no acute 

distress speaking full word sentences appears comfortable )





- Head


Head exam: Present: atraumatic, normocephalic





- Eye


Eye exam: Present: normal appearance





- ENT


ENT exam: Present: mucous membranes moist





- Neck


Neck exam: Present: normal inspection





- Respiratory


Respiratory exam: Present: normal lung sounds bilaterally.  Absent: respiratory 

distress, wheezes, rales, rhonchi





- Cardiovascular


Cardiovascular Exam: Present: regular rate, normal rhythm, normal heart sounds.

  Absent: systolic murmur, diastolic murmur, rubs, gallop





- GI/Abdominal


GI/Abdominal exam: Present: soft, normal bowel sounds.  Absent: distended, 

tenderness, guarding, rebound





- Extremities Exam


Extremities exam: Present: normal inspection





- Back Exam


Back exam: Present: normal inspection





- Neurological Exam


Neurological exam: Present: alert, oriented X3





- Psychiatric


Psychiatric exam: Present: normal affect, normal mood





- Skin


Skin exam: Present: warm, dry, intact, normal color.  Absent: rash





ED Course


 Vital Signs











  04/26/18 04/26/18 04/26/18





  21:54 22:00 22:30


 


Temperature 97.9 F  


 


Pulse Rate 81 83 80


 


Respiratory 13 11 L 11 L





Rate   


 


Blood Pressure 181/92 186/94 170/92


 


Blood Pressure 181/92  





[Left]   


 


O2 Sat by Pulse 100 100 





Oximetry   














  04/26/18 04/27/18





  23:00 00:00


 


Temperature  


 


Pulse Rate 81 74


 


Respiratory 14 15





Rate  


 


Blood Pressure 170/83 173/81


 


Blood Pressure  





[Left]  


 


O2 Sat by Pulse 100 100





Oximetry  














ED Medical Decision Making





- Lab Data


Result diagrams: 


 04/26/18 23:22





 04/26/18 23:22








 Laboratory Results - last 24 hr











  04/26/18 04/26/18





  23:22 23:22


 


WBC  6.0 


 


RBC  4.44 


 


Hgb  11.9 


 


Hct  36.9 


 


MCV  83 


 


MCH  27 L 


 


MCHC  32 


 


RDW  15.9 H 


 


Plt Count  171 


 


Lymph % (Auto)  22.8 


 


Mono % (Auto)  4.4 


 


Eos % (Auto)  1.4 


 


Baso % (Auto)  0.5 


 


Lymph #  1.4 


 


Mono #  0.3 


 


Eos #  0.1 


 


Baso #  0.0 


 


Seg Neutrophils %  70.9 H 


 


Seg Neutrophils #  4.3 


 


Sodium   141


 


Potassium   2.8 L*


 


Chloride   90.9 L


 


Carbon Dioxide   40 H


 


Anion Gap   13


 


BUN   5 L


 


Creatinine   0.6 L


 


Estimated GFR   > 60


 


BUN/Creatinine Ratio   8


 


Glucose   99


 


Calcium   8.1 L


 


Total Bilirubin   2.80 H


 


AST   25


 


ALT   11


 


Alkaline Phosphatase   154 H


 


Troponin T   0.036 H


 


Total Protein   5.6 L


 


Albumin   2.6 L


 


Albumin/Globulin Ratio   0.9


 


Triglycerides   99


 


Cholesterol   187


 


LDL Cholesterol Direct   101


 


HDL Cholesterol   57


 


Cholesterol/HDL Ratio   3.28











 Laboratory Results - last 24 hr











  04/26/18 04/26/18





  23:22 23:22


 


WBC  6.0 


 


RBC  4.44 


 


Hgb  11.9 


 


Hct  36.9 


 


MCV  83 


 


MCH  27 L 


 


MCHC  32 


 


RDW  15.9 H 


 


Plt Count  171 


 


Lymph % (Auto)  22.8 


 


Mono % (Auto)  4.4 


 


Eos % (Auto)  1.4 


 


Baso % (Auto)  0.5 


 


Lymph #  1.4 


 


Mono #  0.3 


 


Eos #  0.1 


 


Baso #  0.0 


 


Seg Neutrophils %  70.9 H 


 


Seg Neutrophils #  4.3 


 


Sodium   141


 


Potassium   2.8 L*


 


Chloride   90.9 L


 


Carbon Dioxide   40 H


 


Anion Gap   13


 


BUN   5 L


 


Creatinine   0.6 L


 


Estimated GFR   > 60


 


BUN/Creatinine Ratio   8


 


Glucose   99


 


Calcium   8.1 L


 


Total Bilirubin   2.80 H


 


AST   25


 


ALT   11


 


Alkaline Phosphatase   154 H


 


Troponin T   0.036 H


 


Total Protein   5.6 L


 


Albumin   2.6 L


 


Albumin/Globulin Ratio   0.9


 


Triglycerides   99


 


Cholesterol   187


 


LDL Cholesterol Direct   101


 


HDL Cholesterol   57


 


Cholesterol/HDL Ratio   3.28











 Vital Signs - 24 hr











  04/26/18 04/26/18 04/26/18





  21:54 22:00 22:30


 


Temperature 97.9 F  


 


Pulse Rate 81 83 80


 


Respiratory 13 11 L 11 L





Rate   


 


Blood Pressure 181/92 186/94 170/92


 


Blood Pressure 181/92  





[Left]   


 


O2 Sat by Pulse 100 100 





Oximetry   














  04/26/18 04/27/18





  23:00 00:00


 


Temperature  


 


Pulse Rate 81 74


 


Respiratory 14 15





Rate  


 


Blood Pressure 170/83 173/81


 


Blood Pressure  





[Left]  


 


O2 Sat by Pulse 100 100





Oximetry  














- Medical Decision Making





This is presents with chest pain with history of extensive coronary artery 

disease.  I reviewed cardiology and left heart catheterization report.  She had 

extensive cardiac evaluation in January.  Cardiac cath report revealed 

progression of multivessel disease.  Son explained that open-heart surgery CABG 

was recommended.  However son and patient declined surgery Due To Frailty.





Patient Does Have Evidence of Mild CHF, son had held diuretic.  He will resume 

diuretic.  She Does Not Have Any Chest Pain at This Time.  She Has Equivocal 

level of Troponin.  Considering Patient Does Not Have Any Chest Pain or 

Respiratory Distress, I Do Feel That She Is Appropriate for Discharge.


Son Will Contact 911 if Ms. Buck 's Symptoms Return 


Diagnosis 1.  Stable angina 2.  CHF exacerbation 3. hypokalemia





rx: potassium chloride


Critical care attestation.: 


If time is entered above; I have spent that time in minutes in the direct care 

of this critically ill patient, excluding procedure time.








ED Disposition


Clinical Impression: 


 Stable angina, Hypokalemia, Acute on chronic diastolic heart failure





Disposition: DC-01 TO HOME OR SELFCARE


Is pt being admited?: No


Does the pt Need Aspirin: No


Condition: Stable


Instructions:  Angina (ED), Heart Failure (ED), Hypokalemia (ED)


Prescriptions: 


Potassium Chloride 2 tab PO DAILY 14 Days #28 tablet.er


Referrals: 


SAROJ COOK MD [Primary Care Provider] - 3-5 Days


Time of Disposition: 01:34

## 2018-07-21 ENCOUNTER — HOSPITAL ENCOUNTER (INPATIENT)
Dept: HOSPITAL 5 - ED | Age: 68
LOS: 6 days | DRG: 871 | End: 2018-07-27
Attending: INTERNAL MEDICINE | Admitting: INTERNAL MEDICINE
Payer: MEDICARE

## 2018-07-21 DIAGNOSIS — Z86.73: ICD-10-CM

## 2018-07-21 DIAGNOSIS — E83.42: ICD-10-CM

## 2018-07-21 DIAGNOSIS — Z95.1: ICD-10-CM

## 2018-07-21 DIAGNOSIS — I25.2: ICD-10-CM

## 2018-07-21 DIAGNOSIS — J96.01: ICD-10-CM

## 2018-07-21 DIAGNOSIS — A41.01: Primary | ICD-10-CM

## 2018-07-21 DIAGNOSIS — G93.41: ICD-10-CM

## 2018-07-21 DIAGNOSIS — E87.0: ICD-10-CM

## 2018-07-21 DIAGNOSIS — Z91.041: ICD-10-CM

## 2018-07-21 DIAGNOSIS — E87.6: ICD-10-CM

## 2018-07-21 DIAGNOSIS — I25.5: ICD-10-CM

## 2018-07-21 DIAGNOSIS — I25.10: ICD-10-CM

## 2018-07-21 DIAGNOSIS — Z66: ICD-10-CM

## 2018-07-21 DIAGNOSIS — I27.20: ICD-10-CM

## 2018-07-21 DIAGNOSIS — L89.154: ICD-10-CM

## 2018-07-21 DIAGNOSIS — Z79.82: ICD-10-CM

## 2018-07-21 DIAGNOSIS — E11.9: ICD-10-CM

## 2018-07-21 DIAGNOSIS — K21.9: ICD-10-CM

## 2018-07-21 DIAGNOSIS — D64.9: ICD-10-CM

## 2018-07-21 DIAGNOSIS — D69.6: ICD-10-CM

## 2018-07-21 DIAGNOSIS — I50.43: ICD-10-CM

## 2018-07-21 DIAGNOSIS — M19.90: ICD-10-CM

## 2018-07-21 DIAGNOSIS — J93.9: ICD-10-CM

## 2018-07-21 DIAGNOSIS — D86.9: ICD-10-CM

## 2018-07-21 DIAGNOSIS — I11.0: ICD-10-CM

## 2018-07-21 DIAGNOSIS — N39.0: ICD-10-CM

## 2018-07-21 DIAGNOSIS — E43: ICD-10-CM

## 2018-07-21 DIAGNOSIS — Z91.018: ICD-10-CM

## 2018-07-21 DIAGNOSIS — E86.0: ICD-10-CM

## 2018-07-21 DIAGNOSIS — J18.9: ICD-10-CM

## 2018-07-21 DIAGNOSIS — M06.9: ICD-10-CM

## 2018-07-21 DIAGNOSIS — R65.21: ICD-10-CM

## 2018-07-21 DIAGNOSIS — Z79.899: ICD-10-CM

## 2018-07-21 DIAGNOSIS — Z87.442: ICD-10-CM

## 2018-07-21 DIAGNOSIS — J44.0: ICD-10-CM

## 2018-07-21 LAB
ALBUMIN SERPL-MCNC: 1.9 G/DL (ref 3.9–5)
ALT SERPL-CCNC: 14 UNITS/L (ref 7–56)
ANISOCYTOSIS BLD QL SMEAR: (no result)
BACTERIA #/AREA URNS HPF: (no result) /HPF
BAND NEUTROPHILS # (MANUAL): 0 K/MM3
BENZODIAZEPINES SCREEN,URINE: (no result)
BILIRUB UR QL STRIP: (no result)
BLOOD UR QL VISUAL: (no result)
BUN SERPL-MCNC: 24 MG/DL (ref 7–17)
BUN/CREAT SERPL: 15 %
CALCIUM SERPL-MCNC: 8.4 MG/DL (ref 8.4–10.2)
HCT VFR BLD CALC: 36.5 % (ref 30.3–42.9)
HEMOLYSIS INDEX: 3
HGB BLD-MCNC: 11.1 GM/DL (ref 10.1–14.3)
INR PPP: 1.48 (ref 0.87–1.13)
MCH RBC QN AUTO: 26 PG (ref 28–32)
MCHC RBC AUTO-ENTMCNC: 31 % (ref 30–34)
MCV RBC AUTO: 86 FL (ref 79–97)
METHADONE SCREEN,URINE: (no result)
MUCOUS THREADS #/AREA URNS HPF: (no result) /HPF
MYELOCYTES # (MANUAL): 0 K/MM3
OPIATE SCREEN,URINE: (no result)
PH UR STRIP: 5 [PH] (ref 5–7)
PLATELET # BLD: 247 K/MM3 (ref 140–440)
PROMYELOCYTES # (MANUAL): 0 K/MM3
RBC # BLD AUTO: 4.24 M/MM3 (ref 3.65–5.03)
RBC #/AREA URNS HPF: 17 /HPF (ref 0–6)
TOTAL CELLS COUNTED BLD: 200
UROBILINOGEN UR-MCNC: 2 MG/DL (ref ?–2)
WBC #/AREA URNS HPF: > 182 /HPF (ref 0–6)

## 2018-07-21 PROCEDURE — 93010 ELECTROCARDIOGRAM REPORT: CPT

## 2018-07-21 PROCEDURE — 82962 GLUCOSE BLOOD TEST: CPT

## 2018-07-21 PROCEDURE — 82803 BLOOD GASES ANY COMBINATION: CPT

## 2018-07-21 PROCEDURE — 74018 RADEX ABDOMEN 1 VIEW: CPT

## 2018-07-21 PROCEDURE — 80048 BASIC METABOLIC PNL TOTAL CA: CPT

## 2018-07-21 PROCEDURE — 82570 ASSAY OF URINE CREATININE: CPT

## 2018-07-21 PROCEDURE — 93306 TTE W/DOPPLER COMPLETE: CPT

## 2018-07-21 PROCEDURE — 84300 ASSAY OF URINE SODIUM: CPT

## 2018-07-21 PROCEDURE — 80320 DRUG SCREEN QUANTALCOHOLS: CPT

## 2018-07-21 PROCEDURE — 93005 ELECTROCARDIOGRAM TRACING: CPT

## 2018-07-21 PROCEDURE — 83735 ASSAY OF MAGNESIUM: CPT

## 2018-07-21 PROCEDURE — 84295 ASSAY OF SERUM SODIUM: CPT

## 2018-07-21 PROCEDURE — 84132 ASSAY OF SERUM POTASSIUM: CPT

## 2018-07-21 PROCEDURE — 85007 BL SMEAR W/DIFF WBC COUNT: CPT

## 2018-07-21 PROCEDURE — 84100 ASSAY OF PHOSPHORUS: CPT

## 2018-07-21 PROCEDURE — 36415 COLL VENOUS BLD VENIPUNCTURE: CPT

## 2018-07-21 PROCEDURE — 87186 SC STD MICRODIL/AGAR DIL: CPT

## 2018-07-21 PROCEDURE — 80053 COMPREHEN METABOLIC PANEL: CPT

## 2018-07-21 PROCEDURE — 82805 BLOOD GASES W/O2 SATURATION: CPT

## 2018-07-21 PROCEDURE — G0480 DRUG TEST DEF 1-7 CLASSES: HCPCS

## 2018-07-21 PROCEDURE — 71045 X-RAY EXAM CHEST 1 VIEW: CPT

## 2018-07-21 PROCEDURE — 87040 BLOOD CULTURE FOR BACTERIA: CPT

## 2018-07-21 PROCEDURE — 83036 HEMOGLOBIN GLYCOSYLATED A1C: CPT

## 2018-07-21 PROCEDURE — 83880 ASSAY OF NATRIURETIC PEPTIDE: CPT

## 2018-07-21 PROCEDURE — 94640 AIRWAY INHALATION TREATMENT: CPT

## 2018-07-21 PROCEDURE — 81001 URINALYSIS AUTO W/SCOPE: CPT

## 2018-07-21 PROCEDURE — 74176 CT ABD & PELVIS W/O CONTRAST: CPT

## 2018-07-21 PROCEDURE — 85610 PROTHROMBIN TIME: CPT

## 2018-07-21 PROCEDURE — 80307 DRUG TEST PRSMV CHEM ANLYZR: CPT

## 2018-07-21 PROCEDURE — 84443 ASSAY THYROID STIM HORMONE: CPT

## 2018-07-21 PROCEDURE — 4A033R1 MEASUREMENT OF ARTERIAL SATURATION, PERIPHERAL, PERCUTANEOUS APPROACH: ICD-10-PCS | Performed by: INTERNAL MEDICINE

## 2018-07-21 PROCEDURE — 87086 URINE CULTURE/COLONY COUNT: CPT

## 2018-07-21 PROCEDURE — 86140 C-REACTIVE PROTEIN: CPT

## 2018-07-21 PROCEDURE — 85025 COMPLETE CBC W/AUTO DIFF WBC: CPT

## 2018-07-21 PROCEDURE — 83935 ASSAY OF URINE OSMOLALITY: CPT

## 2018-07-21 PROCEDURE — 82140 ASSAY OF AMMONIA: CPT

## 2018-07-21 PROCEDURE — 86403 PARTICLE AGGLUT ANTBDY SCRN: CPT

## 2018-07-21 NOTE — HISTORY AND PHYSICAL REPORT
History of Present Illness


Date of examination: 07/21/18


Date of admission: 


07/21/18 17:01





Chief complaint: 


CC


AMS--1 day





History of present illness: 


[place]








The patient is 68-year-old female with a significant history of congestive 

heart failure, diabetes, cognitive deficit, noncommunicable, sacral decubitus 

ulcers, and whom presents for evaluation of altered mental status. Patient has 

been mostly bed ridden since Jan 2017.As per the patient's daughter, the 

patient has exhibited a constant severe decrease in mental status for the past 

one day, exacerbated with activity, and associated with decreased by mouth 

fluid and food intake the patient 1- 2 days.Patient also has sacral  decubitus 

ulcers





Past Medical History


Hypertension


CVAx1


Heart Attack/AMI


Congestive Heart Failure


Diabetes


GERD


Arthritis


Kidney Stones


COPD





Surgical History


Coronary Stent: Yes


Open Heart Surgery: Yes (CABG X2)


Additional Surgical History: Open heart





- Social History


Smoking Status: Never Smoker


Substance Use Type: None





- Medications


Home Medications: 


 Home Medications











 Medication  Instructions  Recorded  Confirmed  Last Taken  Type


 


Gabapentin 300 mg PO BID 12/09/14 07/21/18 05/18/16 History


 


Aspirin [Aspirin BABY CHEW TAB] 81 mg PO QDAY #30 tab.chew 02/22/18 07/21/18 

Unknown Rx


 


Furosemide [Lasix TAB] 20 mg PO QDAY #30 tablet 02/22/18 07/21/18 Unknown Rx


 


Metoprolol [Lopressor TAB] 12.5 mg PO BID #30 tablet 02/22/18 07/21/18 Unknown 

Rx


 


traMADol [Ultram 50 MG tab] 50 mg PO BID #60 tablet 02/22/18 07/21/18 Unknown Rx


 


Potassium Chloride 2 tab PO DAILY 14 Days #28 04/27/18 07/21/18 Unknown Rx





 tablet.er    


 


traMADol [Ultram 50 MG tab] 50 mg PO Q6HR PRN #20 tablet 04/27/18 07/21/18 

Unknown Rx














Review of Systems


ROS: 


Stated complaint: ALTERED MENTAL STATUS


Other details as noted in HPI





Comment: Unobtainable due to pts medical conditions (non-communicative) 

amazingly


Without name: Vital IY diet.  #Units and the [place]





Medications and Allergies


 Allergies











Allergy/AdvReac Type Severity Reaction Status Date / Time


 


atorvastatin calcium Allergy  Unknown Verified 01/03/18 13:37





[From Lipitor]     


 


Iodinated Contrast- Oral and Allergy  Unknown Verified 01/03/18 13:37





IV Dye     





[Iodinated Contrast Media -     





IV Dye]     


 


Penicillins Allergy  Unknown Verified 01/03/18 13:37


 


tomato Allergy  Unknown Verified 01/03/18 13:37











 Home Medications











 Medication  Instructions  Recorded  Confirmed  Last Taken  Type


 


Gabapentin 300 mg PO BID 12/09/14 07/21/18 05/18/16 History


 


Aspirin [Aspirin BABY CHEW TAB] 81 mg PO QDAY #30 tab.chew 02/22/18 07/21/18 

Unknown Rx


 


Furosemide [Lasix TAB] 20 mg PO QDAY #30 tablet 02/22/18 07/21/18 Unknown Rx


 


Metoprolol [Lopressor TAB] 12.5 mg PO BID #30 tablet 02/22/18 07/21/18 Unknown 

Rx


 


traMADol [Ultram 50 MG tab] 50 mg PO BID #60 tablet 02/22/18 07/21/18 Unknown Rx


 


Potassium Chloride 2 tab PO DAILY 14 Days #28 04/27/18 07/21/18 Unknown Rx





 tablet.er    


 


traMADol [Ultram 50 MG tab] 50 mg PO Q6HR PRN #20 tablet 04/27/18 07/21/18 

Unknown Rx














Exam





- Physical Exam


Narrative exam: 


Lethargic,Intermittently alert 








- Constitutional


Vitals: 


 











Temp Pulse Resp BP Pulse Ox


 


 97.9 F   110 H  20   116/74   100 


 


 07/21/18 19:30  07/21/18 20:15  07/21/18 20:42  07/21/18 19:30  07/21/18 19:30











General appearance: Present: no acute distress, well-nourished





- EENT


Eyes: Present: PERRL


ENT: hearing intact, clear oral mucosa





- Neck


Neck: Present: supple, normal ROM





- Respiratory


Respiratory effort: normal


Respiratory: bilateral: CTA





- Cardiovascular


Heart rate: 14


Rhythm: regular


Heart Sounds: Present: S1 & S2.  Absent: rub, click





- Extremities


Extremities: no ischemia, pulses intact, pulses symmetrical, No edema, abnormal 

(Sacral decub ulcer)


Peripheral Pulses: within normal limits





- Abdominal


General gastrointestinal: Present: soft, non-tender, non-distended, normal 

bowel sounds


Female genitourinary: Present: normal





- Integumentary


Integumentary: Present: clear, warm, dry





- Musculoskeletal


Musculoskeletal: gait normal, strength equal bilaterally





- Psychiatric


Psychiatric: appropriate mood/affect, intact judgment & insight





- Neurologic


Neurologic: CNII-XII intact, moves all extremities





Results





- Labs


CBC & Chem 7: 


 07/21/18 16:04





 07/21/18 16:04


Labs: 


 Laboratory Last Values











WBC  24.9 K/mm3 (4.5-11.0)  H  07/21/18  16:04    


 


RBC  4.24 M/mm3 (3.65-5.03)   07/21/18  16:04    


 


Hgb  11.1 gm/dl (10.1-14.3)   07/21/18  16:04    


 


Hct  36.5 % (30.3-42.9)   07/21/18  16:04    


 


MCV  86 fl (79-97)   07/21/18  16:04    


 


MCH  26 pg (28-32)  L  07/21/18  16:04    


 


MCHC  31 % (30-34)   07/21/18  16:04    


 


RDW  16.6 % (13.2-15.2)  H  07/21/18  16:04    


 


Plt Count  247 K/mm3 (140-440)   07/21/18  16:04    


 


Add Manual Diff  Complete   07/21/18  16:04    


 


Total Counted  200   07/21/18  16:04    


 


Seg Neutrophils %  Np   07/21/18  16:04    


 


Seg Neuts % (Manual)  97.0 % (40.0-70.0)  H  07/21/18  16:04    


 


Band Neutrophils %  0 %  07/21/18  16:04    


 


Lymphocytes % (Manual)  1.5 % (13.4-35.0)  L  07/21/18  16:04    


 


Reactive Lymphs % (Man)  0 %  07/21/18  16:04    


 


Monocytes % (Manual)  1.5 % (0.0-7.3)   07/21/18  16:04    


 


Eosinophils % (Manual)  0 % (0.0-4.3)   07/21/18  16:04    


 


Basophils % (Manual)  0 % (0.0-1.8)   07/21/18  16:04    


 


Metamyelocytes %  0 %  07/21/18  16:04    


 


Myelocytes %  0 %  07/21/18  16:04    


 


Promyelocytes %  0 %  07/21/18  16:04    


 


Blast Cells %  0 %  07/21/18  16:04    


 


Nucleated RBC %  Not Reportable   07/21/18  16:04    


 


Seg Neutrophils # Man  24.2 K/mm3 (1.8-7.7)  H  07/21/18  16:04    


 


Band Neutrophils #  0.0 K/mm3  07/21/18  16:04    


 


Lymphocytes # (Manual)  0.4 K/mm3 (1.2-5.4)  L  07/21/18  16:04    


 


Abs React Lymphs (Man)  0.0 K/mm3  07/21/18  16:04    


 


Monocytes # (Manual)  0.4 K/mm3 (0.0-0.8)   07/21/18  16:04    


 


Eosinophils # (Manual)  0.0 K/mm3 (0.0-0.4)   07/21/18  16:04    


 


Basophils # (Manual)  0.0 K/mm3 (0.0-0.1)   07/21/18  16:04    


 


Metamyelocytes #  0.0 K/mm3  07/21/18  16:04    


 


Myelocytes #  0.0 K/mm3  07/21/18  16:04    


 


Promyelocytes #  0.0 K/mm3  07/21/18  16:04    


 


Blast Cells #  0.0 K/mm3  07/21/18  16:04    


 


WBC Morphology  Not Reportable   07/21/18  16:04    


 


Hypersegmented Neuts  Not Reportable   07/21/18  16:04    


 


Hyposegmented Neuts  Not Reportable   07/21/18  16:04    


 


Hypogranular Neuts  Not Reportable   07/21/18  16:04    


 


Smudge Cells  Not Reportable   07/21/18  16:04    


 


Toxic Granulation  Not Reportable   07/21/18  16:04    


 


Toxic Vacuolation  Not Reportable   07/21/18  16:04    


 


Dohle Bodies  Not Reportable   07/21/18  16:04    


 


Pelger-Huet Anomaly  Not Reportable   07/21/18  16:04    


 


Becka Rods  Not Reportable   07/21/18  16:04    


 


Platelet Estimate  Consistent w auto   07/21/18  16:04    


 


Clumped Platelets  Not Reportable   07/21/18  16:04    


 


Plt Clumps, EDTA  Not Reportable   07/21/18  16:04    


 


Large Platelets  Not Reportable   07/21/18  16:04    


 


Giant Platelets  Not Reportable   07/21/18  16:04    


 


Platelet Satelliting  Not Reportable   07/21/18  16:04    


 


Plt Morphology Comment  Not Reportable   07/21/18  16:04    


 


RBC Morphology  Not Reportable   07/21/18  16:04    


 


Dimorphic RBCs  Not Reportable   07/21/18  16:04    


 


Polychromasia  Not Reportable   07/21/18  16:04    


 


Hypochromasia  Not Reportable   07/21/18  16:04    


 


Poikilocytosis  Not Reportable   07/21/18  16:04    


 


Anisocytosis  1+   07/21/18  16:04    


 


Microcytosis  Not Reportable   07/21/18  16:04    


 


Macrocytosis  Not Reportable   07/21/18  16:04    


 


Spherocytes  Not Reportable   07/21/18  16:04    


 


Pappenheimer Bodies  Not Reportable   07/21/18  16:04    


 


Sickle Cells  Not Reportable   07/21/18  16:04    


 


Target Cells  Not Reportable   07/21/18  16:04    


 


Tear Drop Cells  Not Reportable   07/21/18  16:04    


 


Ovalocytes  Not Reportable   07/21/18  16:04    


 


Helmet Cells  Not Reportable   07/21/18  16:04    


 


[place] Bodies  Not Reportable   07/21/18  16:04    


 


Cabot Rings  Not Reportable   07/21/18  16:04    


 


Lalo Cells  Not Reportable   07/21/18  16:04    


 


Bite Cells  Not Reportable   07/21/18  16:04    


 


Crenated Cell  Not Reportable   07/21/18  16:04    


 


Elliptocytes  Not Reportable   07/21/18  16:04    


 


Acanthocytes (Spur)  Not Reportable   07/21/18  16:04    


 


Rouleaux  Not Reportable   07/21/18  16:04    


 


Hemoglobin C Crystals  Not Reportable   07/21/18  16:04    


 


Schistocytes  Not Reportable   07/21/18  16:04    


 


Malaria parasites  Not Reportable   07/21/18  16:04    


 


Reji Bodies  Not Reportable   07/21/18  16:04    


 


Hem Pathologist Commnt  No   07/21/18  16:04    


 


PT  18.8 Sec. (12.2-14.9)  H  07/21/18  16:04    


 


INR  1.48  (0.87-1.13)  H  07/21/18  16:04    


 


POC ABG pH  7.470  (7.35-7.45)  H  07/21/18  16:44    


 


POC ABG pCO2  42.7  (35-45)   07/21/18  16:44    


 


POC ABG pO2  156  ()  H  07/21/18  16:44    


 


POC ABG HCO3  31.1   07/21/18  16:44    


 


POC ABG Total CO2  32   07/21/18  16:44    


 


POC ABG O2 Sat  99   07/21/18  16:44    


 


POC ABG Base Excess  7   07/21/18  16:44    


 


VBG pH  7.308  (7.320-7.420)  L  07/21/18  16:04    


 


FiO2  36 %  07/21/18  16:44    


 


Sodium  145 mmol/L (137-145)   07/21/18  16:04    


 


Potassium  4.0 mmol/L (3.6-5.0)   07/21/18  16:04    


 


Chloride  95.6 mmol/L ()  L  07/21/18  16:04    


 


Carbon Dioxide  27 mmol/L (22-30)   07/21/18  16:04    


 


Anion Gap  26 mmol/L  07/21/18  16:04    


 


BUN  24 mg/dL (7-17)  H  07/21/18  16:04    


 


Creatinine  1.6 mg/dL (0.7-1.2)  H  07/21/18  16:04    


 


Estimated GFR  39 ml/min  07/21/18  16:04    


 


BUN/Creatinine Ratio  15 %  07/21/18  16:04    


 


Glucose  162 mg/dL ()  H  07/21/18  16:04    


 


Lactic Acid  8.80 mmol/L (0.7-2.0)  H*  07/21/18  16:04    


 


Calcium  8.4 mg/dL (8.4-10.2)   07/21/18  16:04    


 


Total Bilirubin  1.90 mg/dL (0.1-1.2)  H  07/21/18  16:04    


 


AST  68 units/L (5-40)  H  07/21/18  16:04    


 


ALT  14 units/L (7-56)   07/21/18  16:04    


 


Alkaline Phosphatase  253 units/L ()  H  07/21/18  16:04    


 


NT-Pro-B Natriuret Pep  98845 pg/mL (0-900)  H  07/21/18  16:04    


 


Total Protein  5.8 g/dL (6.3-8.2)  L  07/21/18  16:04    


 


Albumin  1.9 g/dL (3.9-5)  L  07/21/18  16:04    


 


Albumin/Globulin Ratio  0.5 %  07/21/18  16:04    


 


TSH  1.830 mlU/mL (0.270-4.200)   07/21/18  14:25    


 


Urine Color  Yudi  (Yellow)   07/21/18  15:40    


 


Urine Turbidity  Clear  (Clear)   07/21/18  15:40    


 


Urine pH  5.0  (5.0-7.0)   07/21/18  15:40    


 


Ur Specific Gravity  1.017  (1.003-1.030)   07/21/18  15:40    


 


Urine Protein  100 mg/dl mg/dL (Negative)   07/21/18  15:40    


 


Urine Glucose (UA)  Neg mg/dL (Negative)   07/21/18  15:40    


 


Urine Ketones  Neg mg/dL (Negative)   07/21/18  15:40    


 


Urine Blood  Sm  (Negative)   07/21/18  15:40    


 


Urine Nitrite  Neg  (Negative)   07/21/18  15:40    


 


Urine Bilirubin  Neg  (Negative)   07/21/18  15:40    


 


Urine Urobilinogen  2.0 mg/dL (<2.0)   07/21/18  15:40    


 


Ur Leukocyte Esterase  Mod  (Negative)   07/21/18  15:40    


 


Urine WBC (Auto)  > 182.0 /HPF (0.0-6.0)  H  07/21/18  15:40    


 


Urine RBC (Auto)  17.0 /HPF (0.0-6.0)   07/21/18  15:40    


 


U Epithel Cells (Auto)  8.0 /HPF (0-13.0)   07/21/18  15:40    


 


Urine Bacteria (Auto)  4+ /HPF (Negative)   07/21/18  15:40    


 


Urine WBC Clumps  3+ /HPF  07/21/18  15:40    


 


Urine Mucus  Few /HPF  07/21/18  15:40    


 


Urine Opiates Screen  Presumptive negative   07/21/18  15:40    


 


Urine Methadone Screen  Presumptive positive   07/21/18  15:40    


 


Ur Barbiturates Screen  Presumptive negative   07/21/18  15:40    


 


Ur Phencyclidine Scrn  Presumptive negative   07/21/18  15:40    


 


Ur Amphetamines Screen  Presumptive negative   07/21/18  15:40    


 


U Benzodiazepines Scrn  Presumptive negative   07/21/18  15:40    


 


Urine Cocaine Screen  Presumptive negative   07/21/18  15:40    


 


U Marijuana (THC) Screen  Presumptive negative   07/21/18  15:40    


 


Drugs of Abuse Note  Disclamer   07/21/18  15:40    


 


Plasma/Serum Alcohol  < 0.01 % (0-0.07)   07/21/18  17:09    














- Imaging and Cardiology


EKG: report reviewed (Sinus Tach 114/min)





Assessment and Plan


Advance Directives: Yes (Full code)


VTE prophylaxis?: Chemical


Plan of care discussed with patient/family: Yes





- Patient Problems


(1) Sepsis


Current Visit: Yes   Status: Acute   


Qualifiers: 


   Sepsis type: sepsis due to unspecified organism   Qualified Code(s): A41.9 - 

Sepsis, unspecified organism   


Plan to address problem: 


Sec to Sacral decub ulcer and UTI


IV Zosyn and Vancomycin


Lactic Acid High








(2) Encephalopathy acute


Current Visit: Yes   Status: Acute   


Plan to address problem: 


Sec to Sepsis


Treat sepsis








(3) Dehydration


Current Visit: No   Status: Acute   


Plan to address problem: 


IV Fluids for now








(4) JOSE (acute kidney injury)


Current Visit: Yes   Status: Acute   


Plan to address problem: 


IV Fluids for now








(5) UTI (urinary tract infection)


Current Visit: Yes   Status: Acute   


Qualifiers: 


   Urinary tract infection type: acute cystitis 


Plan to address problem: 


ON Zosyn and Vancomycin for sepsis which should cover UTI also


Check cultures








(6) Sacral decubitus ulcer


Current Visit: Yes   Status: Acute   


Qualifiers: 


   Pressure injury stage: stage 4   Qualified Code(s): L89.154 - Pressure ulcer 

of sacral region, stage 4   


Plan to address problem: 


Wound care\


Surg consult








(7) HTN (hypertension)


Current Visit: Yes   Status: Chronic   


Qualifiers: 


   Hypertension type: essential hypertension   Qualified Code(s): I10 - 

Essential (primary) hypertension   


Plan to address problem: 


  Cont Antihypertensives            








(8) CHF (congestive heart failure)


Current Visit: Yes   Status: Chronic   


Qualifiers: 


   Heart failure type: combined systolic and diastolic 


Plan to address problem: 


Check ECHO


High BNP


Lasix as tolerated given JOSE








(9) DVT prophylaxis


Current Visit: No   Status: Chronic   


Plan to address problem: 


Cont Lovenox @ 30mg q24h

## 2018-07-21 NOTE — EMERGENCY DEPARTMENT REPORT
HPI





- General


Chief Complaint: Altered Mental Status


Time Seen by Provider: 07/21/18 14:52





- HPI


HPI: 





The patient is 68-year-old female with a significant history of congestive 

heart failure, diabetes, cognitive deficit, noncommunicable, sacral decubitus 

ulcers, and whom presents for evaluation of altered mental status.  Per the 

patient's daughter, the patient has exhibited a constant severe decrease in 

mental status for the past one day, exacerbated with activity, and associated 

with decreased by mouth fluid and food intake the patient 1- 2 days.








ED Past Medical Hx





- Past Medical History


Hx Hypertension: Yes


Hx CVA: Yes


Hx Heart Attack/AMI: Yes


Hx Congestive Heart Failure: Yes


Hx Diabetes: Yes


Hx GERD: Yes (GERD)


Hx Liver Disease: No


Hx Renal Disease: Yes (renal stones)


Hx Arthritis: Yes


Hx Kidney Stones: Yes


Hx Asthma: No


Hx COPD: Yes


Hx HIV: No





- Surgical History


Hx Coronary Stent: Yes


Hx Open Heart Surgery: Yes (CABG X2)


Additional Surgical History: Open heart





- Social History


Smoking Status: Never Smoker


Substance Use Type: None





- Medications


Home Medications: 


 Home Medications











 Medication  Instructions  Recorded  Confirmed  Last Taken  Type


 


Gabapentin 300 mg PO BID 12/09/14 07/21/18 05/18/16 History


 


Aspirin [Aspirin BABY CHEW TAB] 81 mg PO QDAY #30 tab.chew 02/22/18 07/21/18 

Unknown Rx


 


Furosemide [Lasix TAB] 20 mg PO QDAY #30 tablet 02/22/18 07/21/18 Unknown Rx


 


Metoprolol [Lopressor TAB] 12.5 mg PO BID #30 tablet 02/22/18 07/21/18 Unknown 

Rx


 


traMADol [Ultram 50 MG tab] 50 mg PO BID #60 tablet 02/22/18 07/21/18 Unknown Rx


 


Potassium Chloride 2 tab PO DAILY 14 Days #28 04/27/18 07/21/18 Unknown Rx





 tablet.er    


 


traMADol [Ultram 50 MG tab] 50 mg PO Q6HR PRN #20 tablet 04/27/18 07/21/18 

Unknown Rx














ED Review of Systems


ROS: 


Stated complaint: ALTERED MENTAL STATUS


Other details as noted in HPI





Comment: Unobtainable due to pts medical conditions (non-communicative)





Physical Exam





- Physical Exam


Vital Signs: 


 Vital Signs











  07/21/18 07/21/18 07/21/18





  13:59 14:00 14:16


 


Temperature   


 


Pulse Rate 115 H 115 H 115 H


 


Respiratory 19 28 H 21





Rate   


 


Blood Pressure   87/56


 


Blood Pressure   





[Left]   


 


O2 Sat by Pulse   98





Oximetry   














  07/21/18 07/21/18 07/21/18





  14:21 14:27 15:33


 


Temperature 99.1 F  


 


Pulse Rate 115 H 115 H 117 H


 


Respiratory 17  16





Rate   


 


Blood Pressure 87/56  


 


Blood Pressure   124/69





[Left]   


 


O2 Sat by Pulse 82 L  96





Oximetry   











Physical Exam: 








General: well-nourished, well-developed, no acute distress


Head: Normocephalic, atraumatic


Eyes: normal sclera


ENT: Mucous membranes are pale and dry


Neck: No neck stiffness, no cervical adenopathy


Respiratory: Diminished breath sounds and rhonchi present to right lower lung 

fields


Cardio: S1 and S2 present, no murmurs, rubs, gallops, capillary refill is 

delayed


Abdomen: Normoactive bowel sounds, soft abdomen, no rigidity, no guarding or 

rebound tenderness


Chest WALL/Back: No tenderness to palpation of the chest wall, no CVA 

tenderness with percussion


: Sacral decubitus ulcers present


Musc: No pitting edema


Skin: No rash


Neuro: no facial drooping, normal speech


Psych: Normal affect








ED Course


 Vital Signs











  07/21/18 07/21/18 07/21/18





  13:59 14:00 14:16


 


Temperature   


 


Pulse Rate 115 H 115 H 115 H


 


Respiratory 19 28 H 21





Rate   


 


Blood Pressure   87/56


 


Blood Pressure   





[Left]   


 


O2 Sat by Pulse   98





Oximetry   














  07/21/18 07/21/18 07/21/18





  14:21 14:27 15:33


 


Temperature 99.1 F  


 


Pulse Rate 115 H 115 H 117 H


 


Respiratory 17  16





Rate   


 


Blood Pressure 87/56  


 


Blood Pressure   124/69





[Left]   


 


O2 Sat by Pulse 82 L  96





Oximetry   














ED Medical Decision Making





- Lab Data


Result diagrams: 


 07/21/18 16:04





 07/21/18 16:04





- Medical Decision Making





The patient was seen and examined by myself.  The patient is placed on a 

cardiac monitor and continuous pulse ox. On initial evaluation, the patient was 

found to be in no distress, although with severely low blood pressure a 

significant tachycardia.  The patient is given 30 cc/kg normal saline fluid 

bolus for treatment of her dehydration, hypotension, I suspect a septic shock  

versus hypovolemic shock.  Multiple bedside reassessments were performed to 

assess patient's responsiveness to fluids resuscitation. Evaluation orders were 

placed.  X-ray of the chest reveals a right lower infiltrate concerning for 

pneumonia.  Lab results revealed leukocytosis, WBC 24.9, elevated creatinine of 

1.6, elevated lactic acid 8.8, elevated BNP of 46,000.  The patient is given IV 

Rocephin, Levaquin, and vancomycin for treatment of suspected pneumonia. The on-

call hospitalist service was contacted.  They agreed to admit the patient for 

further treatment and close monitoring.  The ED admit order was placed.  The 

patient was admitted in guarded condition.





Critical Care Time: Yes


Critical care time in (mins) excluding proc time.: 35


Critical care attestation.: 





Due to the critical nature of this patients presentation, which necessitated 

multiple bedside assessments, manipulation and supportive measures to prevent 

further life threatening deterioration, I would like to bill for a total of 35 

minutes of critical care time. This was exclusive of any separately billable 

procedures.








Critical Care Time: 





35 min





ED Disposition


Clinical Impression: 


 Septic shock, Acute lower UTI (urinary tract infection)





Pneumonia


Qualifiers:


 Pneumonia type: due to unspecified organism Laterality: right Lung location: 

lower lobe of lung Qualified Code(s): J18.1 - Lobar pneumonia, unspecified 

organism





Altered mental status, unspecified


Qualifiers:


 Altered mental status type: unspecified Qualified Code(s): R41.82 - Altered 

mental status, unspecified





Disposition: [id number] OP ADMIT IP TO THIS HOSP


Is pt being admited?: Yes


Does the pt Need Aspirin: Yes


Condition: Critical


Time of Disposition: 16:29

## 2018-07-22 LAB
ALBUMIN SERPL-MCNC: 1.6 G/DL (ref 3.9–5)
ALT SERPL-CCNC: 16 UNITS/L (ref 7–56)
BASOPHILS # (AUTO): 0 K/MM3 (ref 0–0.1)
BASOPHILS NFR BLD AUTO: 0.1 % (ref 0–1.8)
BUN SERPL-MCNC: 26 MG/DL (ref 7–17)
BUN/CREAT SERPL: 22 %
CALCIUM SERPL-MCNC: 8.1 MG/DL (ref 8.4–10.2)
EOSINOPHIL # BLD AUTO: 0 K/MM3 (ref 0–0.4)
EOSINOPHIL NFR BLD AUTO: 0.1 % (ref 0–4.3)
HCT VFR BLD CALC: 33.9 % (ref 30.3–42.9)
HEMOLYSIS INDEX: 20
HGB BLD-MCNC: 10.5 GM/DL (ref 10.1–14.3)
LYMPHOCYTES # BLD AUTO: 1.4 K/MM3 (ref 1.2–5.4)
LYMPHOCYTES NFR BLD AUTO: 7.8 % (ref 13.4–35)
MCH RBC QN AUTO: 26 PG (ref 28–32)
MCHC RBC AUTO-ENTMCNC: 31 % (ref 30–34)
MCV RBC AUTO: 85 FL (ref 79–97)
MONOCYTES # (AUTO): 0.5 K/MM3 (ref 0–0.8)
MONOCYTES % (AUTO): 3 % (ref 0–7.3)
PLATELET # BLD: 178 K/MM3 (ref 140–440)
RBC # BLD AUTO: 3.98 M/MM3 (ref 3.65–5.03)

## 2018-07-22 RX ADMIN — GABAPENTIN SCH: 300 CAPSULE ORAL at 23:11

## 2018-07-22 RX ADMIN — CEFEPIME SCH MLS/HR: 1 INJECTION, POWDER, FOR SOLUTION INTRAMUSCULAR; INTRAVENOUS at 10:12

## 2018-07-22 RX ADMIN — GABAPENTIN SCH MG: 300 CAPSULE ORAL at 10:12

## 2018-07-22 RX ADMIN — METOPROLOL TARTRATE SCH: 25 TABLET, FILM COATED ORAL at 23:10

## 2018-07-22 RX ADMIN — Medication SCH ML: at 10:14

## 2018-07-22 RX ADMIN — FUROSEMIDE SCH MG: 20 TABLET ORAL at 10:12

## 2018-07-22 RX ADMIN — Medication SCH: at 23:11

## 2018-07-22 RX ADMIN — VANCOMYCIN HYDROCHLORIDE SCH MLS/HR: 100 INJECTION, POWDER, LYOPHILIZED, FOR SOLUTION INTRAVENOUS at 17:29

## 2018-07-22 RX ADMIN — GABAPENTIN SCH MG: 300 CAPSULE ORAL at 01:05

## 2018-07-22 RX ADMIN — ENOXAPARIN SODIUM SCH MG: 100 INJECTION SUBCUTANEOUS at 23:09

## 2018-07-22 RX ADMIN — METOPROLOL TARTRATE SCH MG: 25 TABLET, FILM COATED ORAL at 10:12

## 2018-07-22 RX ADMIN — SODIUM CHLORIDE SCH MLS/HR: 0.9 INJECTION, SOLUTION INTRAVENOUS at 01:04

## 2018-07-22 RX ADMIN — METOPROLOL TARTRATE SCH MG: 25 TABLET, FILM COATED ORAL at 01:04

## 2018-07-22 RX ADMIN — FAMOTIDINE SCH MG: 10 INJECTION, SOLUTION INTRAVENOUS at 10:12

## 2018-07-22 RX ADMIN — ASPIRIN SCH MG: 81 TABLET, CHEWABLE ORAL at 10:13

## 2018-07-22 RX ADMIN — POTASSIUM CHLORIDE SCH MEQ: 1500 TABLET, EXTENDED RELEASE ORAL at 10:12

## 2018-07-22 NOTE — PROGRESS NOTE
Assessment and Plan


Assessment and plan: 


--Sepsis; probably due to decubitus ulcers as well as UTI


Empiric antibiotics Vanco and Zosyn, follow cultures


IV fluids and supportive care





--Lactic acidosis; secondary to sepsis


Continue current management





--Metabolic encephalopathy; probably due to sepsis


Patient is noncommunicative





--Acute on chronic systolic congestive heart failure;


Continue current cardiac medications





--History of coronary artery disease status post CABG; 


continue current cardiac medications


cardiology evaluation if needed





--Urinary tract infection; IV fluids


Empiric antibiotics, follow cultures





--Stage IV sacral decubitus ulcer; wound care


Antibiotics follow cultures, surgery evaluation and recommendations noted


Surgical debridement if needed





--Severe malnutrition/hypoalbuminemia; nutrition supplement and supportive care


Nutrition consult, poor oral intake


Patient may need tube feeding, discussed with son at the bedside


Not willing at this point, want to consult with family members and inform





--DVT prophylaxis Lovenox





--Full CODE STATUS





Very poor prognosis in view of dementia, sepsis and severe malnutrition, 

decubitus ulcers,


Since condition plan of care discussed with the son at the bedside




















History


Interval history: 


Patient seen and examined medical records reviewed


Elderly frail female patient, severe malnutrition


Chronically ill-looking and cachectic admitted with altered level of 

consciousness


And sepsis





Patient is sleeping, noncommunicative


Poor oral intake, failure to thrive


Vital signs reviewed


Son is at the bedside








Hospitalist Physical





- Constitutional


Vitals: 


 











Temp Pulse Resp BP Pulse Ox


 


 98.2 F   67   18   125/92   100 


 


 07/22/18 08:39  07/22/18 11:26  07/22/18 10:00  07/22/18 10:12  07/22/18 10:00











General appearance: Present: no acute distress, cachectic, disheveled, other (

chronically ill-looking)





- Neck


Neck: Present: supple, normal ROM





- Respiratory


Respiratory effort: normal


Respiratory: bilateral: diminished, rhonchi, negative: rales, wheezing





- Cardiovascular


Rhythm: regular


Heart Sounds: Present: S1 & S2





- Extremities


Extremities: no ischemia, No edema





- Abdominal


General gastrointestinal: soft, non-tender, non-distended, normal bowel sounds





- Integumentary


Integumentary: Present: clear, warm, erythema (stage IV sacral decubitus ulcer)





- Psychiatric


Psychiatric: other (noncommunicative)





- Neurologic


Neurologic: other (noncommunicative)





Results





- Labs


CBC & Chem 7: 


 07/21/18 16:04





 07/21/18 16:04


Labs: 


 Laboratory Last Values











WBC  24.9 K/mm3 (4.5-11.0)  H  07/21/18  16:04    


 


RBC  4.24 M/mm3 (3.65-5.03)   07/21/18  16:04    


 


Hgb  11.1 gm/dl (10.1-14.3)   07/21/18  16:04    


 


Hct  36.5 % (30.3-42.9)   07/21/18  16:04    


 


MCV  86 fl (79-97)   07/21/18  16:04    


 


MCH  26 pg (28-32)  L  07/21/18  16:04    


 


MCHC  31 % (30-34)   07/21/18  16:04    


 


RDW  16.6 % (13.2-15.2)  H  07/21/18  16:04    


 


Plt Count  247 K/mm3 (140-440)   07/21/18  16:04    


 


Add Manual Diff  Complete   07/21/18  16:04    


 


Total Counted  200   07/21/18  16:04    


 


Seg Neutrophils %  Np   07/21/18  16:04    


 


Seg Neuts % (Manual)  97.0 % (40.0-70.0)  H  07/21/18  16:04    


 


Band Neutrophils %  0 %  07/21/18  16:04    


 


Lymphocytes % (Manual)  1.5 % (13.4-35.0)  L  07/21/18  16:04    


 


Reactive Lymphs % (Man)  0 %  07/21/18  16:04    


 


Monocytes % (Manual)  1.5 % (0.0-7.3)   07/21/18  16:04    


 


Eosinophils % (Manual)  0 % (0.0-4.3)   07/21/18  16:04    


 


Basophils % (Manual)  0 % (0.0-1.8)   07/21/18  16:04    


 


Metamyelocytes %  0 %  07/21/18  16:04    


 


Myelocytes %  0 %  07/21/18  16:04    


 


Promyelocytes %  0 %  07/21/18  16:04    


 


Blast Cells %  0 %  07/21/18  16:04    


 


Nucleated RBC %  Not Reportable   07/21/18  16:04    


 


Seg Neutrophils # Man  24.2 K/mm3 (1.8-7.7)  H  07/21/18  16:04    


 


Band Neutrophils #  0.0 K/mm3  07/21/18  16:04    


 


Lymphocytes # (Manual)  0.4 K/mm3 (1.2-5.4)  L  07/21/18  16:04    


 


Abs React Lymphs (Man)  0.0 K/mm3  07/21/18  16:04    


 


Monocytes # (Manual)  0.4 K/mm3 (0.0-0.8)   07/21/18  16:04    


 


Eosinophils # (Manual)  0.0 K/mm3 (0.0-0.4)   07/21/18  16:04    


 


Basophils # (Manual)  0.0 K/mm3 (0.0-0.1)   07/21/18  16:04    


 


Metamyelocytes #  0.0 K/mm3  07/21/18  16:04    


 


Myelocytes #  0.0 K/mm3  07/21/18  16:04    


 


Promyelocytes #  0.0 K/mm3  07/21/18  16:04    


 


Blast Cells #  0.0 K/mm3  07/21/18  16:04    


 


WBC Morphology  Not Reportable   07/21/18  16:04    


 


Hypersegmented Neuts  Not Reportable   07/21/18  16:04    


 


Hyposegmented Neuts  Not Reportable   07/21/18  16:04    


 


Hypogranular Neuts  Not Reportable   07/21/18  16:04    


 


Smudge Cells  Not Reportable   07/21/18  16:04    


 


Toxic Granulation  Not Reportable   07/21/18  16:04    


 


Toxic Vacuolation  Not Reportable   07/21/18  16:04    


 


Dohle Bodies  Not Reportable   07/21/18  16:04    


 


Pelger-Huet Anomaly  Not Reportable   07/21/18  16:04    


 


Becka Rods  Not Reportable   07/21/18  16:04    


 


Platelet Estimate  Consistent w auto   07/21/18  16:04    


 


Clumped Platelets  Not Reportable   07/21/18  16:04    


 


Plt Clumps, EDTA  Not Reportable   07/21/18  16:04    


 


Large Platelets  Not Reportable   07/21/18  16:04    


 


Giant Platelets  Not Reportable   07/21/18  16:04    


 


Platelet Satelliting  Not Reportable   07/21/18  16:04    


 


Plt Morphology Comment  Not Reportable   07/21/18  16:04    


 


RBC Morphology  Not Reportable   07/21/18  16:04    


 


Dimorphic RBCs  Not Reportable   07/21/18  16:04    


 


Polychromasia  Not Reportable   07/21/18  16:04    


 


Hypochromasia  Not Reportable   07/21/18  16:04    


 


Poikilocytosis  Not Reportable   07/21/18  16:04    


 


Anisocytosis  1+   07/21/18  16:04    


 


Microcytosis  Not Reportable   07/21/18  16:04    


 


Macrocytosis  Not Reportable   07/21/18  16:04    


 


Spherocytes  Not Reportable   07/21/18  16:04    


 


Pappenheimer Bodies  Not Reportable   07/21/18  16:04    


 


Sickle Cells  Not Reportable   07/21/18  16:04    


 


Target Cells  Not Reportable   07/21/18  16:04    


 


Tear Drop Cells  Not Reportable   07/21/18  16:04    


 


Ovalocytes  Not Reportable   07/21/18  16:04    


 


Helmet Cells  Not Reportable   07/21/18  16:04    


 


Daniels-Enville Bodies  Not Reportable   07/21/18  16:04    


 


Cabot Rings  Not Reportable   07/21/18  16:04    


 


[employer] Cells  Not Reportable   07/21/18  16:04    


 


Bite Cells  Not Reportable   07/21/18  16:04    


 


Crenated Cell  Not Reportable   07/21/18  16:04    


 


Elliptocytes  Not Reportable   07/21/18  16:04    


 


Acanthocytes (Spur)  Not Reportable   07/21/18  16:04    


 


Rouleaux  Not Reportable   07/21/18  16:04    


 


Hemoglobin C Crystals  Not Reportable   07/21/18  16:04    


 


Schistocytes  Not Reportable   07/21/18  16:04    


 


Malaria parasites  Not Reportable   07/21/18  16:04    


 


Reji Bodies  Not Reportable   07/21/18  16:04    


 


Hem Pathologist Commnt  No   07/21/18  16:04    


 


PT  18.8 Sec. (12.2-14.9)  H  07/21/18  16:04    


 


INR  1.48  (0.87-1.13)  H  07/21/18  16:04    


 


POC ABG pH  7.470  (7.35-7.45)  H  07/21/18  16:44    


 


POC ABG pCO2  42.7  (35-45)   07/21/18  16:44    


 


POC ABG pO2  156  ()  H  07/21/18  16:44    


 


POC ABG HCO3  31.1   07/21/18  16:44    


 


POC ABG Total CO2  32   07/21/18  16:44    


 


POC ABG O2 Sat  99   07/21/18  16:44    


 


POC ABG Base Excess  7   07/21/18  16:44    


 


VBG pH  7.308  (7.320-7.420)  L  07/21/18  16:04    


 


FiO2  36 %  07/21/18  16:44    


 


Sodium  145 mmol/L (137-145)   07/21/18  16:04    


 


Potassium  4.0 mmol/L (3.6-5.0)   07/21/18  16:04    


 


Chloride  95.6 mmol/L ()  L  07/21/18  16:04    


 


Carbon Dioxide  27 mmol/L (22-30)   07/21/18  16:04    


 


Anion Gap  26 mmol/L  07/21/18  16:04    


 


BUN  24 mg/dL (7-17)  H  07/21/18  16:04    


 


Creatinine  1.6 mg/dL (0.7-1.2)  H  07/21/18  16:04    


 


Estimated GFR  39 ml/min  07/21/18  16:04    


 


BUN/Creatinine Ratio  15 %  07/21/18  16:04    


 


Glucose  162 mg/dL ()  H  07/21/18  16:04    


 


POC Glucose  98  ()   07/22/18  05:56    


 


Hemoglobin A1c  5.4 % (4-6)   07/21/18  23:13    


 


Lactic Acid  2.00 mmol/L (0.7-2.0)   07/22/18  00:47    


 


Calcium  8.4 mg/dL (8.4-10.2)   07/21/18  16:04    


 


Total Bilirubin  1.90 mg/dL (0.1-1.2)  H  07/21/18  16:04    


 


AST  68 units/L (5-40)  H  07/21/18  16:04    


 


ALT  14 units/L (7-56)   07/21/18  16:04    


 


Alkaline Phosphatase  253 units/L ()  H  07/21/18  16:04    


 


NT-Pro-B Natriuret Pep  26278 pg/mL (0-900)  H  07/21/18  16:04    


 


Total Protein  5.8 g/dL (6.3-8.2)  L  07/21/18  16:04    


 


Albumin  1.9 g/dL (3.9-5)  L  07/21/18  16:04    


 


Albumin/Globulin Ratio  0.5 %  07/21/18  16:04    


 


TSH  1.830 mlU/mL (0.270-4.200)   07/21/18  14:25    


 


Urine Color  Yudi  (Yellow)   07/21/18  15:40    


 


Urine Turbidity  Clear  (Clear)   07/21/18  15:40    


 


Urine pH  5.0  (5.0-7.0)   07/21/18  15:40    


 


Ur Specific Gravity  1.017  (1.003-1.030)   07/21/18  15:40    


 


Urine Protein  100 mg/dl mg/dL (Negative)   07/21/18  15:40    


 


Urine Glucose (UA)  Neg mg/dL (Negative)   07/21/18  15:40    


 


Urine Ketones  Neg mg/dL (Negative)   07/21/18  15:40    


 


Urine Blood  Sm  (Negative)   07/21/18  15:40    


 


Urine Nitrite  Neg  (Negative)   07/21/18  15:40    


 


Urine Bilirubin  Neg  (Negative)   07/21/18  15:40    


 


Urine Urobilinogen  2.0 mg/dL (<2.0)   07/21/18  15:40    


 


Ur Leukocyte Esterase  Mod  (Negative)   07/21/18  15:40    


 


Urine WBC (Auto)  > 182.0 /HPF (0.0-6.0)  H  07/21/18  15:40    


 


Urine RBC (Auto)  17.0 /HPF (0.0-6.0)   07/21/18  15:40    


 


U Epithel Cells (Auto)  8.0 /HPF (0-13.0)   07/21/18  15:40    


 


Urine Bacteria (Auto)  4+ /HPF (Negative)   07/21/18  15:40    


 


Urine WBC Clumps  3+ /HPF  07/21/18  15:40    


 


Urine Mucus  Few /HPF  07/21/18  15:40    


 


Urine Opiates Screen  Presumptive negative   07/21/18  15:40    


 


Urine Methadone Screen  Presumptive positive   07/21/18  15:40    


 


Ur Barbiturates Screen  Presumptive negative   07/21/18  15:40    


 


Ur Phencyclidine Scrn  Presumptive negative   07/21/18  15:40    


 


Ur Amphetamines Screen  Presumptive negative   07/21/18  15:40    


 


U Benzodiazepines Scrn  Presumptive negative   07/21/18  15:40    


 


Urine Cocaine Screen  Presumptive negative   07/21/18  15:40    


 


U Marijuana (THC) Screen  Presumptive negative   07/21/18  15:40    


 


Drugs of Abuse Note  Disclamer   07/21/18  15:40    


 


Plasma/Serum Alcohol  < 0.01 % (0-0.07)   07/21/18  17:09

## 2018-07-22 NOTE — CONSULTATION
History of Present Illness


Consult date: 07/22/18


Chief complaint: 





sacral wound





- History of present illness


History of present illness: 


69 yo F with hx of DM, HTN, CAD, CHF who was brought to the ER by her family 

due to altered mental status and shallow breathing. The patient is a poor 

historian and all history is obtained from chart and her son at the bedside. 

Apparently, the patient lives alone and was doing well up until several months 

ago. Since then she has rapidly declined with her mental status, appetite, and 

general medical health. She often eats only one meal per day and was recently 

started on megestrol to increase her appetite. PEG tube was discussed with them 

in the past and they refused. Her family cares for her and turn her often to 

help offload the wounds. 








Past History


Past Medical History: acute MI, CAD, COPD, diabetes, GERD, heart failure, 

hypertension, stroke, other


Past Surgical History: CABG


Social history: no significant social history


Family history: no significant family history





Medications and Allergies


 Allergies











Allergy/AdvReac Type Severity Reaction Status Date / Time


 


atorvastatin calcium Allergy  Unknown Verified 01/03/18 13:37





[From Lipitor]     


 


Iodinated Contrast- Oral and Allergy  Unknown Verified 01/03/18 13:37





IV Dye     





[Iodinated Contrast Media -     





IV Dye]     


 


Penicillins Allergy  Unknown Verified 01/03/18 13:37


 


tomato Allergy  Unknown Verified 01/03/18 13:37











 Home Medications











 Medication  Instructions  Recorded  Confirmed  Last Taken  Type


 


Gabapentin 300 mg PO BID 12/09/14 07/21/18 05/18/16 History


 


Aspirin [Aspirin BABY CHEW TAB] 81 mg PO QDAY #30 tab.chew 02/22/18 07/21/18 

Unknown Rx


 


Furosemide [Lasix TAB] 20 mg PO QDAY #30 tablet 02/22/18 07/21/18 Unknown Rx


 


Metoprolol [Lopressor TAB] 12.5 mg PO BID #30 tablet 02/22/18 07/21/18 Unknown 

Rx


 


traMADol [Ultram 50 MG tab] 50 mg PO BID #60 tablet 02/22/18 07/21/18 Unknown Rx


 


Potassium Chloride 2 tab PO DAILY 14 Days #28 04/27/18 07/21/18 Unknown Rx





 tablet.er    


 


traMADol [Ultram 50 MG tab] 50 mg PO Q6HR PRN #20 tablet 04/27/18 07/21/18 

Unknown Rx











Active Meds: 


Active Medications





Acetaminophen (Tylenol)  650 mg PO Q4H PRN


   PRN Reason: Pain MILD(1-3)/Fever >100.5/HA


Aspirin (Baby Aspirin)  81 mg PO QDAY [facility]


   Last Admin: 07/22/18 10:13 Dose:  81 mg


Enoxaparin Sodium (Lovenox)  30 mg SUB-Q QDAY@2200 [facility]


Famotidine (Pepcid)  20 mg IV DAILY [facility]


   Last Admin: 07/22/18 10:12 Dose:  20 mg


Furosemide (Lasix)  20 mg PO QDAY [facility]


   Last Admin: 07/22/18 10:12 Dose:  20 mg


Gabapentin (Neurontin)  300 mg PO BID [facility]


   Last Admin: 07/22/18 10:12 Dose:  300 mg


Sodium Chloride (Nacl 0.9% 1000 Ml)  1,000 mls @ 75 mls/hr IV AS DIRECT [facility]


   Last Admin: 07/22/18 01:04 Dose:  75 mls/hr


Vancomycin HCl 750 mg/ Sodium (Chloride)  257.5 mls @ 166.667 mls/hr IV Q24H [facility]


Cefepime HCl (Maxipime/Ns 1 Gm/100 Ml)  1 gm in 100 mls @ 200 mls/hr IV Q24H [facility]


   Last Admin: 07/22/18 10:12 Dose:  200 mls/hr


Metoprolol Tartrate (Lopressor)  12.5 mg PO BID [facility]


   Last Admin: 07/22/18 10:12 Dose:  12.5 mg


Morphine Sulfate (Morphine)  2 mg IV Q4H PRN


   PRN Reason: Pain, Moderate (4-6)


Ondansetron HCl (Zofran)  4 mg IV Q8H PRN


   PRN Reason: Nausea And Vomiting


Potassium Chloride (K-Dur)  20 meq PO QDAY [facility]


   Last Admin: 07/22/18 10:12 Dose:  20 meq


Sodium Chloride (Sodium Chloride Flush Syringe 10 Ml)  10 ml IV BID [facility]


   Last Admin: 07/22/18 10:14 Dose:  10 ml


Sodium Chloride (Sodium Chloride Flush Syringe 10 Ml)  10 ml IV PRN PRN


   PRN Reason: LINE FLUSH


Vancomycin HCl (Vancomycin Pharmacy To Dose)  1 each IV PKCONSULT [facility]; Protocol











Review of Systems


ROS unobtainable: due to mental status





Exam


 Vital Signs











Pulse Resp


 


 115 H  19 


 


 07/21/18 13:59  07/21/18 13:59











Narrative exam: 





Gen.: arousable, nonverbal. NAD. Cachectic


CV: S1, S2 present


Respiratory: CTAB, no w/r/r


Abdomen: Soft, nondistended, NT.  No rebound, rigidity, guarding


Extremities: No clubbing, cyanosis, edema


Sacrum: Left ischial and sacral decubitus wounds.  The outer perimeter of the 

wounds appears clean. Both wounds have a central necrotic cap.  There is no 

fluctuance, erythema, drainage, induration.





Results





- Labs





 07/21/18 16:04





 07/21/18 16:04


 Abnormal lab results











  07/21/18 07/21/18 07/21/18 Range/Units





  15:40 16:04 16:04 


 


WBC   24.9 H   (4.5-11.0)  K/mm3


 


MCH   26 L   (28-32)  pg


 


RDW   16.6 H   (13.2-15.2)  %


 


Seg Neuts % (Manual)   97.0 H   (40.0-70.0)  %


 


Lymphocytes % (Manual)   1.5 L   (13.4-35.0)  %


 


Seg Neutrophils # Man   24.2 H   (1.8-7.7)  K/mm3


 


Lymphocytes # (Manual)   0.4 L   (1.2-5.4)  K/mm3


 


PT    18.8 H  (12.2-14.9)  Sec.


 


INR    1.48 H  (0.87-1.13)  


 


POC ABG pH     (7.35-7.45)  


 


POC ABG pO2     ()  


 


VBG pH     (7.320-7.420)  


 


Chloride     ()  mmol/L


 


BUN     (7-17)  mg/dL


 


Creatinine     (0.7-1.2)  mg/dL


 


Glucose     ()  mg/dL


 


POC Glucose     ()  


 


Lactic Acid     (0.7-2.0)  mmol/L


 


Total Bilirubin     (0.1-1.2)  mg/dL


 


AST     (5-40)  units/L


 


Alkaline Phosphatase     ()  units/L


 


NT-Pro-B Natriuret Pep     (0-900)  pg/mL


 


Total Protein     (6.3-8.2)  g/dL


 


Albumin     (3.9-5)  g/dL


 


Urine WBC (Auto)  > 182.0 H    (0.0-6.0)  /HPF














  07/21/18 07/21/18 07/21/18 Range/Units





  16:04 16:04 16:04 


 


WBC     (4.5-11.0)  K/mm3


 


MCH     (28-32)  pg


 


RDW     (13.2-15.2)  %


 


Seg Neuts % (Manual)     (40.0-70.0)  %


 


Lymphocytes % (Manual)     (13.4-35.0)  %


 


Seg Neutrophils # Man     (1.8-7.7)  K/mm3


 


Lymphocytes # (Manual)     (1.2-5.4)  K/mm3


 


PT     (12.2-14.9)  Sec.


 


INR     (0.87-1.13)  


 


POC ABG pH     (7.35-7.45)  


 


POC ABG pO2     ()  


 


VBG pH    7.308 L  (7.320-7.420)  


 


Chloride  95.6 L    ()  mmol/L


 


BUN  24 H    (7-17)  mg/dL


 


Creatinine  1.6 H    (0.7-1.2)  mg/dL


 


Glucose  162 H    ()  mg/dL


 


POC Glucose     ()  


 


Lactic Acid   8.80 H*   (0.7-2.0)  mmol/L


 


Total Bilirubin  1.90 H    (0.1-1.2)  mg/dL


 


AST  68 H    (5-40)  units/L


 


Alkaline Phosphatase  253 H    ()  units/L


 


NT-Pro-B Natriuret Pep     (0-900)  pg/mL


 


Total Protein  5.8 L    (6.3-8.2)  g/dL


 


Albumin  1.9 L    (3.9-5)  g/dL


 


Urine WBC (Auto)     (0.0-6.0)  /HPF














  07/21/18 07/21/18 07/21/18 Range/Units





  16:04 16:44 23:03 


 


WBC     (4.5-11.0)  K/mm3


 


MCH     (28-32)  pg


 


RDW     (13.2-15.2)  %


 


Seg Neuts % (Manual)     (40.0-70.0)  %


 


Lymphocytes % (Manual)     (13.4-35.0)  %


 


Seg Neutrophils # Man     (1.8-7.7)  K/mm3


 


Lymphocytes # (Manual)     (1.2-5.4)  K/mm3


 


PT     (12.2-14.9)  Sec.


 


INR     (0.87-1.13)  


 


POC ABG pH   7.470 H   (7.35-7.45)  


 


POC ABG pO2   156 H   ()  


 


VBG pH     (7.320-7.420)  


 


Chloride     ()  mmol/L


 


BUN     (7-17)  mg/dL


 


Creatinine     (0.7-1.2)  mg/dL


 


Glucose     ()  mg/dL


 


POC Glucose    158 H  ()  


 


Lactic Acid     (0.7-2.0)  mmol/L


 


Total Bilirubin     (0.1-1.2)  mg/dL


 


AST     (5-40)  units/L


 


Alkaline Phosphatase     ()  units/L


 


NT-Pro-B Natriuret Pep  76602 H    (0-900)  pg/mL


 


Total Protein     (6.3-8.2)  g/dL


 


Albumin     (3.9-5)  g/dL


 


Urine WBC (Auto)     (0.0-6.0)  /HPF














  07/21/18 07/22/18 Range/Units





  23:08 12:36 


 


WBC    (4.5-11.0)  K/mm3


 


MCH    (28-32)  pg


 


RDW    (13.2-15.2)  %


 


Seg Neuts % (Manual)    (40.0-70.0)  %


 


Lymphocytes % (Manual)    (13.4-35.0)  %


 


Seg Neutrophils # Man    (1.8-7.7)  K/mm3


 


Lymphocytes # (Manual)    (1.2-5.4)  K/mm3


 


PT    (12.2-14.9)  Sec.


 


INR    (0.87-1.13)  


 


POC ABG pH    (7.35-7.45)  


 


POC ABG pO2    ()  


 


VBG pH    (7.320-7.420)  


 


Chloride    ()  mmol/L


 


BUN    (7-17)  mg/dL


 


Creatinine    (0.7-1.2)  mg/dL


 


Glucose    ()  mg/dL


 


POC Glucose   109 H  ()  


 


Lactic Acid  3.00 H*   (0.7-2.0)  mmol/L


 


Total Bilirubin    (0.1-1.2)  mg/dL


 


AST    (5-40)  units/L


 


Alkaline Phosphatase    ()  units/L


 


NT-Pro-B Natriuret Pep    (0-900)  pg/mL


 


Total Protein    (6.3-8.2)  g/dL


 


Albumin    (3.9-5)  g/dL


 


Urine WBC (Auto)    (0.0-6.0)  /HPF








 Diabetes panel











  07/21/18 07/21/18 Range/Units





  16:04 23:13 


 


Sodium  145   (137-145)  mmol/L


 


Potassium  4.0   (3.6-5.0)  mmol/L


 


Chloride  95.6 L   ()  mmol/L


 


Carbon Dioxide  27   (22-30)  mmol/L


 


BUN  24 H   (7-17)  mg/dL


 


Creatinine  1.6 H   (0.7-1.2)  mg/dL


 


Glucose  162 H   ()  mg/dL


 


Hemoglobin A1c   5.4  (4-6)  %


 


Calcium  8.4   (8.4-10.2)  mg/dL


 


AST  68 H   (5-40)  units/L


 


ALT  14   (7-56)  units/L


 


Alkaline Phosphatase  253 H   ()  units/L


 


Total Protein  5.8 L   (6.3-8.2)  g/dL


 


Albumin  1.9 L   (3.9-5)  g/dL








 Thyroid panel











  07/21/18 Range/Units





  14:25 


 


TSH  1.830  (0.270-4.200)  mlU/mL








 Calcium panel











  07/21/18 Range/Units





  16:04 


 


Calcium  8.4  (8.4-10.2)  mg/dL


 


Albumin  1.9 L  (3.9-5)  g/dL








 Pituitary panel











  07/21/18 07/21/18 Range/Units





  14:25 16:04 


 


Sodium   145  (137-145)  mmol/L


 


Potassium   4.0  (3.6-5.0)  mmol/L


 


Chloride   95.6 L  ()  mmol/L


 


Carbon Dioxide   27  (22-30)  mmol/L


 


BUN   24 H  (7-17)  mg/dL


 


Creatinine   1.6 H  (0.7-1.2)  mg/dL


 


Glucose   162 H  ()  mg/dL


 


Calcium   8.4  (8.4-10.2)  mg/dL


 


TSH  1.830   (0.270-4.200)  mlU/mL








 Adrenal panel











  07/21/18 Range/Units





  16:04 


 


Sodium  145  (137-145)  mmol/L


 


Potassium  4.0  (3.6-5.0)  mmol/L


 


Chloride  95.6 L  ()  mmol/L


 


Carbon Dioxide  27  (22-30)  mmol/L


 


BUN  24 H  (7-17)  mg/dL


 


Creatinine  1.6 H  (0.7-1.2)  mg/dL


 


Glucose  162 H  ()  mg/dL


 


Calcium  8.4  (8.4-10.2)  mg/dL


 


Total Bilirubin  1.90 H  (0.1-1.2)  mg/dL


 


AST  68 H  (5-40)  units/L


 


ALT  14  (7-56)  units/L


 


Alkaline Phosphatase  253 H  ()  units/L


 


Total Protein  5.8 L  (6.3-8.2)  g/dL


 


Albumin  1.9 L  (3.9-5)  g/dL














Assessment and Plan





68-year-old female with





1. UTI


2. left ischial and sacral decubitus ulcer - not infected, likely not cause of 

sepsis


3. sepsis 2/2 #1, 


4. severe protein calorie malnutrition


5. diabetes





Plan:





1. offloading, turn q2


2. wound care cs pending


3. hydrogel to left ischial and sacral decubitus wounds


4. optimize nutrition, nutrition consult


5. IV abx


6. trend WBC


7. I discussed the patient's condition with her son who is at the bedside. We 

discussed surgical debridement vs topical debridement of the wounds, need to 

improve nutrition, and offloading wound. We also discussed possible need for 

PEG tube since patient has no appetite and frequently only eats one small meal 

a day at baseline. He states the family has refused PEG in the past and 

surgical intervention in general. He will discuss with his family regarding 

surgical debridement. I will follow up on the decision tomorrow.





Thank you for this consultation, please call with questions or concerns.

## 2018-07-23 LAB
ALBUMIN SERPL-MCNC: 1.5 G/DL (ref 3.9–5)
ALT SERPL-CCNC: 12 UNITS/L (ref 7–56)
ANISOCYTOSIS BLD QL SMEAR: (no result)
BAND NEUTROPHILS # (MANUAL): 0.3 K/MM3
BUN SERPL-MCNC: 24 MG/DL (ref 7–17)
BUN/CREAT SERPL: 24 %
CALCIUM SERPL-MCNC: 7.7 MG/DL (ref 8.4–10.2)
HCT VFR BLD CALC: 31.8 % (ref 30.3–42.9)
HEMOLYSIS INDEX: 6
HGB BLD-MCNC: 9.9 GM/DL (ref 10.1–14.3)
MCH RBC QN AUTO: 26 PG (ref 28–32)
MCHC RBC AUTO-ENTMCNC: 31 % (ref 30–34)
MCV RBC AUTO: 85 FL (ref 79–97)
MYELOCYTES # (MANUAL): 0 K/MM3
PLATELET # BLD: 135 K/MM3 (ref 140–440)
PROMYELOCYTES # (MANUAL): 0 K/MM3
RBC # BLD AUTO: 3.76 M/MM3 (ref 3.65–5.03)
TOTAL CELLS COUNTED BLD: 100

## 2018-07-23 RX ADMIN — POTASSIUM CHLORIDE SCH: 1500 TABLET, EXTENDED RELEASE ORAL at 13:06

## 2018-07-23 RX ADMIN — MORPHINE SULFATE PRN MG: 2 INJECTION, SOLUTION INTRAMUSCULAR; INTRAVENOUS at 20:48

## 2018-07-23 RX ADMIN — Medication SCH: at 12:59

## 2018-07-23 RX ADMIN — FAMOTIDINE SCH MG: 10 INJECTION, SOLUTION INTRAVENOUS at 11:30

## 2018-07-23 RX ADMIN — ENOXAPARIN SODIUM SCH MG: 100 INJECTION SUBCUTANEOUS at 22:00

## 2018-07-23 RX ADMIN — METOPROLOL TARTRATE SCH: 25 TABLET, FILM COATED ORAL at 12:58

## 2018-07-23 RX ADMIN — GABAPENTIN SCH: 300 CAPSULE ORAL at 12:58

## 2018-07-23 RX ADMIN — MORPHINE SULFATE PRN MG: 2 INJECTION, SOLUTION INTRAMUSCULAR; INTRAVENOUS at 05:50

## 2018-07-23 RX ADMIN — POTASSIUM CHLORIDE SCH: 1500 TABLET, EXTENDED RELEASE ORAL at 13:08

## 2018-07-23 RX ADMIN — VANCOMYCIN HYDROCHLORIDE SCH MLS/HR: 100 INJECTION, POWDER, LYOPHILIZED, FOR SOLUTION INTRAVENOUS at 18:28

## 2018-07-23 RX ADMIN — CEFEPIME SCH MLS/HR: 1 INJECTION, POWDER, FOR SOLUTION INTRAMUSCULAR; INTRAVENOUS at 13:04

## 2018-07-23 RX ADMIN — SODIUM CHLORIDE SCH MLS/HR: 0.9 INJECTION, SOLUTION INTRAVENOUS at 01:19

## 2018-07-23 RX ADMIN — METOPROLOL TARTRATE SCH MG: 25 TABLET, FILM COATED ORAL at 22:00

## 2018-07-23 NOTE — CONSULTATION
History of Present Illness





- Reason for Consult


Consult date: 07/23/18


Staph aureus bacteremia


Requesting physician: AMY J KOCHERLA





- History of Present Illness


69 y/o female with history of congestive heart failure, diabetes, cognitive 

deficit, noncommunicable, sacral decubitus ulcers; admitted due to altered 

mental status and  progressive SOB. Patient has been mostly bed ridden since 

Jan 2017. As per the patient's daughter, the patient has exhibited a constant 

severe decrease in mental status for 24h and anorexia. Per daughter, she 

developed sacral decubitus while admitted at [facility].





In the ED< temp, 99.1, , R19, BP 87/56, WBC 24.9K, Hg 11.1, Plat 247. 

Creat 1.2. Lactate 1.8. Total bili 1.4. CXR new RLL consolidation.





Microbiology:





Blood cultures:


7/21 Staph aureus 1 of 4 7/23 ngtd





Urine cultures:





Respiratory cultures:


 





Current Antimicrobials:


Vancomycin


Cefepime


 





Previous Antimicrobials:





Past History


Past Medical History: acute MI, CAD, COPD, diabetes, GERD, heart failure, 

hypertension, stroke, other


Past Surgical History: CABG


Social history: no significant social history


Family history: no significant family history





Medications and Allergies


 Allergies











Allergy/AdvReac Type Severity Reaction Status Date / Time


 


atorvastatin calcium Allergy  Unknown Verified 01/03/18 13:37





[From Lipitor]     


 


Iodinated Contrast- Oral and Allergy  Unknown Verified 01/03/18 13:37





IV Dye     





[Iodinated Contrast Media -     





IV Dye]     


 


Penicillins Allergy  Unknown Verified 01/03/18 13:37


 


tomato Allergy  Unknown Verified 01/03/18 13:37











 Home Medications











 Medication  Instructions  Recorded  Confirmed  Last Taken  Type


 


Gabapentin 300 mg PO BID 12/09/14 07/21/18 05/18/16 History


 


Aspirin [Aspirin BABY CHEW TAB] 81 mg PO QDAY #30 tab.chew 02/22/18 07/21/18 

Unknown Rx


 


Furosemide [Lasix TAB] 20 mg PO QDAY #30 tablet 02/22/18 07/21/18 Unknown Rx


 


Metoprolol [Lopressor TAB] 12.5 mg PO BID #30 tablet 02/22/18 07/21/18 Unknown 

Rx


 


traMADol [Ultram 50 MG tab] 50 mg PO BID #60 tablet 02/22/18 07/21/18 Unknown Rx


 


Potassium Chloride 2 tab PO DAILY 14 Days #28 04/27/18 07/21/18 Unknown Rx





 tablet.er    


 


traMADol [Ultram 50 MG tab] 50 mg PO Q6HR PRN #20 tablet 04/27/18 07/21/18 

Unknown Rx











Active Meds: 


Active Medications





Acetaminophen (Tylenol)  650 mg PO Q4H PRN


   PRN Reason: Pain MILD(1-3)/Fever >100.5/HA


Lipase/Protease/Amylase (Pancreaze Dr 10,500 Unit)  1 each FEEDTUBE PRN PRN


   PRN Reason: For Clogged Feeding Tube


Aspirin (Baby Aspirin)  81 mg PO QDAY [facility]


   Last Admin: 07/22/18 10:13 Dose:  81 mg


Enoxaparin Sodium (Lovenox)  30 mg SUB-Q QDAY@2200 [facility]


   Last Admin: 07/22/18 23:09 Dose:  30 mg


Famotidine (Pepcid)  20 mg IV DAILY [facility]


   Last Admin: 07/22/18 10:12 Dose:  20 mg


Furosemide (Lasix)  20 mg PO QDAY [facility]


   Last Admin: 07/22/18 10:12 Dose:  20 mg


Gabapentin (Neurontin)  300 mg PO BID [facility]


   Last Admin: 07/22/18 23:11 Dose:  Not Given


Sodium Chloride (Nacl 0.9% 1000 Ml)  1,000 mls @ 75 mls/hr IV AS DIRECT [facility]


   Last Admin: 07/23/18 01:19 Dose:  75 mls/hr


Vancomycin HCl 750 mg/ Sodium (Chloride)  257.5 mls @ 166.667 mls/hr IV Q24H [facility]


   Last Admin: 07/22/18 17:29 Dose:  166.667 mls/hr


Cefepime HCl (Maxipime/Ns 1 Gm/100 Ml)  1 gm in 100 mls @ 200 mls/hr IV Q24H [facility]


   Last Admin: 07/22/18 10:12 Dose:  200 mls/hr


Magnesium Sulfate 2 gm/ Sodium (Chloride)  54 mls @ 25 mls/hr IV ONCE ONE


   Stop: 07/23/18 11:09


Metoprolol Tartrate (Lopressor)  12.5 mg PO BID [facility]


   Last Admin: 07/22/18 23:10 Dose:  Not Given


Morphine Sulfate (Morphine)  2 mg IV Q4H PRN


   PRN Reason: Pain, Moderate (4-6)


   Last Admin: 07/23/18 05:50 Dose:  2 mg


Ondansetron HCl (Zofran)  4 mg IV Q8H PRN


   PRN Reason: Nausea And Vomiting


Potassium Chloride (K-Dur)  20 meq PO QDAY [facility]


   Last Admin: 07/22/18 10:12 Dose:  20 meq


Potassium Chloride (K-Dur)  40 meq PO Q3H [facility]


   Stop: 07/23/18 11:01


Simple Syrup (Simple Syrup)  15 ml FEEDTUBE PRN PRN


   PRN Reason: Hypoglycemia


Simple Syrup (Simple Syrup)  30 ml FEEDTUBE PRN PRN


   PRN Reason: Hypoglycemia


Sodium Bicarbonate (Sodium Bicarbonate)  325 mg FEEDTUBE PRN PRN


   PRN Reason: For Clogged Feeding Tube


Sodium Chloride (Sodium Chloride Flush Syringe 10 Ml)  10 ml IV BID [facility]


   Last Admin: 07/22/18 23:11 Dose:  Not Given


Sodium Chloride (Sodium Chloride Flush Syringe 10 Ml)  10 ml IV PRN PRN


   PRN Reason: LINE FLUSH


Vancomycin HCl (Vancomycin Pharmacy To Dose)  1 each IV PKCONSULT [facility]; Protocol











Physical Examination





- Physical Exam


Narrative exam: 





General appearance: Alert in NAD,non  conversant 


Eyes: anicteric sclerae, moist conjunctivae; no lid-lag; PERRLA 


HENT: Atraumatic; oropharynx limited edentulous


Neck: Trachea midline; supple, no thyromegaly or lymphadenopathy 


Lungs: distant BS nunu


CV: irreg


Abdomen: Soft, non-tender 


Extremities: No peripheral edema or extremity lymphadenopathy


Skin:  


NON-STAGEABLE PRESSURE INJURY TO SACRAL AREA-ULCER MEASURES 4X7.5X0.1-NO 

DRAINAGE PRESENT-NECROTIC TISSUE PRESENT TO MAJORITY OF ULCER-HYDROCOLLOID 

APPLIED TO ULCER TO PROMOTE AUTOLYTIC DEBRIDEMENT





NON-STAGEABLE PRESSURE INJURY TO RIGHT BUTTOCKS-ULCER MEASURES 4X3X0.1-NO 

DRAINAGE PRESENT-NECROTIC TISSUE TO MAJORITY OF ULCER-HYDROCOLLOID APPLIED TO 

PROMOTE AUTOLYTIC DEBRIDEMENT 





Psych: Appropriate affect, alert and oriented to person, place and time. Neuro: 

alert and oriented x 3. Moving all extermities


Lines: No CVL / PICC











- Constitutional


Vitals: 


 Vital Signs











Temp Pulse Resp BP Pulse Ox


 


 98 F   78   18   136/69   100 


 


 07/23/18 06:09  07/23/18 06:09  07/23/18 06:09  07/23/18 06:09  07/23/18 06:09








 Temperature -Last 24 Hours











Temperature                    98 F


 


Temperature                    97.6 F


 


Temperature                    97.6 F


 


Temperature                    98.0 F


 


Temperature                    97.8 F

















Results





- Labs


CBC & Chem 7: 


 07/23/18 06:38





 07/23/18 06:38


Labs: 


 Abnormal lab results











  07/21/18 07/21/18 07/21/18 Range/Units





  16:04 16:04 16:04 


 


WBC     (4.5-11.0)  K/mm3


 


Hgb     (10.1-14.3)  gm/dl


 


MCH     (28-32)  pg


 


RDW     (13.2-15.2)  %


 


Plt Count     (140-440)  K/mm3


 


Lymph % (Auto)     (13.4-35.0)  %


 


Seg Neutrophils %     (40.0-70.0)  %


 


Seg Neuts % (Manual)     (40.0-70.0)  %


 


Lymphocytes % (Manual)     (13.4-35.0)  %


 


Seg Neutrophils #     (1.8-7.7)  K/mm3


 


Seg Neutrophils # Man     (1.8-7.7)  K/mm3


 


Lymphocytes # (Manual)     (1.2-5.4)  K/mm3


 


PT  18.8 H    (12.2-14.9)  Sec.


 


INR  1.48 H    (0.87-1.13)  


 


VBG pH     (7.320-7.420)  


 


Sodium     (137-145)  mmol/L


 


Potassium     (3.6-5.0)  mmol/L


 


Chloride   95.6 L   ()  mmol/L


 


BUN   24 H   (7-17)  mg/dL


 


Creatinine   1.6 H   (0.7-1.2)  mg/dL


 


Glucose   162 H   ()  mg/dL


 


POC Glucose     ()  


 


Lactic Acid    8.80 H*  (0.7-2.0)  mmol/L


 


Calcium     (8.4-10.2)  mg/dL


 


Magnesium     (1.7-2.3)  mg/dL


 


Total Bilirubin   1.90 H   (0.1-1.2)  mg/dL


 


AST   68 H   (5-40)  units/L


 


Alkaline Phosphatase   253 H   ()  units/L


 


Total Protein   5.8 L   (6.3-8.2)  g/dL


 


Albumin   1.9 L   (3.9-5)  g/dL














  07/21/18 07/22/18 07/22/18 Range/Units





  16:04 12:36 15:38 


 


WBC     (4.5-11.0)  K/mm3


 


Hgb     (10.1-14.3)  gm/dl


 


MCH     (28-32)  pg


 


RDW     (13.2-15.2)  %


 


Plt Count     (140-440)  K/mm3


 


Lymph % (Auto)     (13.4-35.0)  %


 


Seg Neutrophils %     (40.0-70.0)  %


 


Seg Neuts % (Manual)     (40.0-70.0)  %


 


Lymphocytes % (Manual)     (13.4-35.0)  %


 


Seg Neutrophils #     (1.8-7.7)  K/mm3


 


Seg Neutrophils # Man     (1.8-7.7)  K/mm3


 


Lymphocytes # (Manual)     (1.2-5.4)  K/mm3


 


PT     (12.2-14.9)  Sec.


 


INR     (0.87-1.13)  


 


VBG pH  7.308 L    (7.320-7.420)  


 


Sodium     (137-145)  mmol/L


 


Potassium     (3.6-5.0)  mmol/L


 


Chloride     ()  mmol/L


 


BUN     (7-17)  mg/dL


 


Creatinine     (0.7-1.2)  mg/dL


 


Glucose     ()  mg/dL


 


POC Glucose   109 H  110 H  ()  


 


Lactic Acid     (0.7-2.0)  mmol/L


 


Calcium     (8.4-10.2)  mg/dL


 


Magnesium     (1.7-2.3)  mg/dL


 


Total Bilirubin     (0.1-1.2)  mg/dL


 


AST     (5-40)  units/L


 


Alkaline Phosphatase     ()  units/L


 


Total Protein     (6.3-8.2)  g/dL


 


Albumin     (3.9-5)  g/dL














  07/22/18 07/22/18 07/23/18 Range/Units





  18:24 18:24 06:38 


 


WBC  17.4 H   15.7 H  (4.5-11.0)  K/mm3


 


Hgb    9.9 L  (10.1-14.3)  gm/dl


 


MCH  26 L   26 L  (28-32)  pg


 


RDW  16.5 H   16.6 H  (13.2-15.2)  %


 


Plt Count    135 L  (140-440)  K/mm3


 


Lymph % (Auto)  7.8 L    (13.4-35.0)  %


 


Seg Neutrophils %  89.0 H    (40.0-70.0)  %


 


Seg Neuts % (Manual)    95.0 H  (40.0-70.0)  %


 


Lymphocytes % (Manual)    1.0 L  (13.4-35.0)  %


 


Seg Neutrophils #  15.5 H    (1.8-7.7)  K/mm3


 


Seg Neutrophils # Man    14.9 H  (1.8-7.7)  K/mm3


 


Lymphocytes # (Manual)    0.2 L  (1.2-5.4)  K/mm3


 


PT     (12.2-14.9)  Sec.


 


INR     (0.87-1.13)  


 


VBG pH     (7.320-7.420)  


 


Sodium     (137-145)  mmol/L


 


Potassium   3.5 L   (3.6-5.0)  mmol/L


 


Chloride     ()  mmol/L


 


BUN   26 H   (7-17)  mg/dL


 


Creatinine     (0.7-1.2)  mg/dL


 


Glucose     ()  mg/dL


 


POC Glucose     ()  


 


Lactic Acid     (0.7-2.0)  mmol/L


 


Calcium   8.1 L   (8.4-10.2)  mg/dL


 


Magnesium     (1.7-2.3)  mg/dL


 


Total Bilirubin   1.40 H   (0.1-1.2)  mg/dL


 


AST     (5-40)  units/L


 


Alkaline Phosphatase   203 H   ()  units/L


 


Total Protein   5.4 L   (6.3-8.2)  g/dL


 


Albumin   1.6 L   (3.9-5)  g/dL














  07/23/18 Range/Units





  06:38 


 


WBC   (4.5-11.0)  K/mm3


 


Hgb   (10.1-14.3)  gm/dl


 


MCH   (28-32)  pg


 


RDW   (13.2-15.2)  %


 


Plt Count   (140-440)  K/mm3


 


Lymph % (Auto)   (13.4-35.0)  %


 


Seg Neutrophils %   (40.0-70.0)  %


 


Seg Neuts % (Manual)   (40.0-70.0)  %


 


Lymphocytes % (Manual)   (13.4-35.0)  %


 


Seg Neutrophils #   (1.8-7.7)  K/mm3


 


Seg Neutrophils # Man   (1.8-7.7)  K/mm3


 


Lymphocytes # (Manual)   (1.2-5.4)  K/mm3


 


PT   (12.2-14.9)  Sec.


 


INR   (0.87-1.13)  


 


VBG pH   (7.320-7.420)  


 


Sodium  150 H  (137-145)  mmol/L


 


Potassium  3.0 L  (3.6-5.0)  mmol/L


 


Chloride  107.9 H  ()  mmol/L


 


BUN  24 H  (7-17)  mg/dL


 


Creatinine   (0.7-1.2)  mg/dL


 


Glucose  58 L  ()  mg/dL


 


POC Glucose   ()  


 


Lactic Acid   (0.7-2.0)  mmol/L


 


Calcium  7.7 L  (8.4-10.2)  mg/dL


 


Magnesium  1.60 L  (1.7-2.3)  mg/dL


 


Total Bilirubin  1.30 H  (0.1-1.2)  mg/dL


 


AST   (5-40)  units/L


 


Alkaline Phosphatase  166 H  ()  units/L


 


Total Protein  4.8 L  (6.3-8.2)  g/dL


 


Albumin  1.5 L  (3.9-5)  g/dL














Assessment and Plan





Assessment: 


1) Sepsis: present on admission, manifested by low grade fever, hypotension, 

leukocytosis, tachycardia; etiology - Staph bacteremia


2) Staph aureus bacteremia: source likely sacral decubitus vs. pneumonia


3) Non stageable sacral ulcer 4x7.5x0.1cm and Non stageable right buttocks 

ucler 4x3x0.1: ? infected 


4) Presumed pneumonia


5) Diabetes


6) Acute encephalopathy 


7) Congestive heart failure


 


 


Plan:


-follow-up blood cx ID and MICs


-repeat blood cx 


-pelvic CT 


-TTE 


-Continue vanco and cefepime for now


-wound care





guarded prognosis 





Thank you for your consultation, will follow up with you. 





Alicia Ramirez MD


Infectious Diseases Specialist


[facility] ([facility])


M [phone]


O [phone]

## 2018-07-23 NOTE — PROGRESS NOTE
Assessment and Plan





68-year-old female with





1. UTI


2. left ischial and sacral decubitus ulcer - not infected, likely not cause of 

sepsis


3. sepsis 2/2 #1, 


4. severe protein calorie malnutrition


5. diabetes





Plan:





1. offloading, turn q2


2. d/w wound care nurse - agrees that wounds appears noninfected. Duoderm 

applied to help with topical gentle debridement of wound - pt has appointment 

to follow up in wound care clinic on Thursday 


4. optimize nutrition, Start TF via dobhoff


5. IV abx


6. trend WBC - improving


7. I discussed the patient's condition with her daughter who is at the bedside. 

We discussed surgical debridement vs topical debridement of the wounds, need to 

improve nutrition, and offloading wound. I recommended PEG tube placement 

especially if they are hoping to be aggressive with wound care. I stressed the 

importance of nutrition for wound healing. She understands.





Will continue conservative measures for wound care at this time including daily 

dressing changes. Pt to follow up in wound care clinic on discharge for further 

follow up.





Thank you for this consultation, please call with questions or concerns.








Subjective


Date of service: 07/23/18


Narrative: 





Pt seen and examined. No overnight events. Daughter at bedside.





Objective


 Vital Signs - 12hr











  07/23/18 07/23/18 07/23/18





  06:09 08:31 10:00


 


Temperature 98 F 97.9 F 


 


Pulse Rate 78  


 


Respiratory 18 14 





Rate   


 


Blood Pressure  125/60 


 


Blood Pressure 136/69  





[Left]   


 


O2 Sat by Pulse 100  98





Oximetry   














  07/23/18 07/23/18 07/23/18





  12:00 14:23 14:33


 


Temperature  98.4 F 98.4 F


 


Pulse Rate 80  88


 


Respiratory  22 22





Rate   


 


Blood Pressure  115/70 


 


Blood Pressure   115/70





[Left]   


 


O2 Sat by Pulse  100 95





Oximetry   














- General physical appearance


Narrative Exam: 





Gen: sleeping. NAD


ENT: Dobhoff in place


CV: s1, S2+


resp: even and unlabored








- Labs





 07/23/18 06:38





 07/23/18 06:38


 Diabetes panel











  07/21/18 07/22/18 07/23/18 Range/Units





  16:04 18:24 06:38 


 


Sodium  145  145  150 H  (137-145)  mmol/L


 


Potassium  4.0  3.5 L  3.0 L  (3.6-5.0)  mmol/L


 


Chloride  95.6 L  103.0  107.9 H  ()  mmol/L


 


Carbon Dioxide  27  30  29  (22-30)  mmol/L


 


BUN  24 H  26 H  24 H  (7-17)  mg/dL


 


Creatinine  1.6 H  1.2  1.0  (0.7-1.2)  mg/dL


 


Glucose  162 H  80  58 L  ()  mg/dL


 


Calcium  8.4  8.1 L  7.7 L  (8.4-10.2)  mg/dL


 


AST  68 H  37  23  (5-40)  units/L


 


ALT  14  16  12  (7-56)  units/L


 


Alkaline Phosphatase  253 H  203 H  166 H  ()  units/L


 


Total Protein  5.8 L  5.4 L  4.8 L  (6.3-8.2)  g/dL


 


Albumin  1.9 L  1.6 L  1.5 L  (3.9-5)  g/dL








 Calcium panel











  07/21/18 07/22/18 07/23/18 Range/Units





  16:04 18:24 06:38 


 


Calcium  8.4  8.1 L  7.7 L  (8.4-10.2)  mg/dL


 


Phosphorus    2.60  (2.5-4.5)  mg/dL


 


Albumin  1.9 L  1.6 L  1.5 L  (3.9-5)  g/dL








 Pituitary panel











  07/21/18 07/22/18 07/23/18 Range/Units





  16:04 18:24 06:38 


 


Sodium  145  145  150 H  (137-145)  mmol/L


 


Potassium  4.0  3.5 L  3.0 L  (3.6-5.0)  mmol/L


 


Chloride  95.6 L  103.0  107.9 H  ()  mmol/L


 


Carbon Dioxide  27  30  29  (22-30)  mmol/L


 


BUN  24 H  26 H  24 H  (7-17)  mg/dL


 


Creatinine  1.6 H  1.2  1.0  (0.7-1.2)  mg/dL


 


Glucose  162 H  80  58 L  ()  mg/dL


 


Calcium  8.4  8.1 L  7.7 L  (8.4-10.2)  mg/dL








 Adrenal panel











  07/21/18 07/22/18 07/23/18 Range/Units





  16:04 18:24 06:38 


 


Sodium  145  145  150 H  (137-145)  mmol/L


 


Potassium  4.0  3.5 L  3.0 L  (3.6-5.0)  mmol/L


 


Chloride  95.6 L  103.0  107.9 H  ()  mmol/L


 


Carbon Dioxide  27  30  29  (22-30)  mmol/L


 


BUN  24 H  26 H  24 H  (7-17)  mg/dL


 


Creatinine  1.6 H  1.2  1.0  (0.7-1.2)  mg/dL


 


Glucose  162 H  80  58 L  ()  mg/dL


 


Calcium  8.4  8.1 L  7.7 L  (8.4-10.2)  mg/dL


 


Total Bilirubin  1.90 H  1.40 H  1.30 H  (0.1-1.2)  mg/dL


 


AST  68 H  37  23  (5-40)  units/L


 


ALT  14  16  12  (7-56)  units/L


 


Alkaline Phosphatase  253 H  203 H  166 H  ()  units/L


 


Total Protein  5.8 L  5.4 L  4.8 L  (6.3-8.2)  g/dL


 


Albumin  1.9 L  1.6 L  1.5 L  (3.9-5)  g/dL

## 2018-07-23 NOTE — XRAY REPORT
AP ABDOMEN:



HISTORY: Dobbhoff tube placement..



The distal tip of the Dobbhoff tube terminates in the body of the 

stomach. The abdominal gas pattern is unremarkable.  No masses or

organomegaly is identified and there is no gross evidence of free air 

or fluid.  No significant soft tissue calcifications are noted.



IMPRESSION:

Unremarkable abdomen.

## 2018-07-23 NOTE — PROGRESS NOTE
Assessment and Plan


Assessment and plan: 


68-year-old female patient with significant history of congestive heart failure 

and diabetes mellitus 


sacral decubitus ulcers was admitted through emergency room with altered level 

of consciousness 


and failure to thrive with poor oral intake.  Patient is being symptomatically 

managed with empiric antibiotics, 


surgery evaluated the patient, possible surgical debridement if needed, blood 

cultures 1;2 positive for staph aureus, and ID consulted








--Staph aureus bacteremia; 1:2 blood cultures,ID cosult


already on vancomycin, contact isolation until MRSA is ruled out





--Stage IV decubitus ulcer; wound care,IV antibiotics


Surgery following, possible surgical debridement if needed





--Sepsis;due to UTI


Continue Vanco and Zosyn, follow cultures





--Lactic acidosis; secondary to sepsis


Continue current management





--Metabolic encephalopathy; probably due to sepsis


Patient is noncommunicative





--Acute on chronic systolic congestive heart failure; EF 10-15%


Continue anti-failure medications, cardiology evaluation





--History of coronary artery disease status post CABG; 


continue current cardiac medications, cardiology evaluation if needed





--Severe malnutrition/hypoalbuminemia; 


nutrition supplement and supportive care, Nutrition consult 


poor oral intake, discussed with her daughter, agreed for Dobbhoff feeding 


Patient may need PEG placement at some point





--Code status discussed with the daughter at the bedside, will check with the 

family and inform us





--Full code at this point





Patient is critically ill with poor prognosis


Plan of care reviewed with the patient's daughter at the bedside and her nurse





Disposition; follow consultations and recommendations





History


Interval history: 


Sincerely and examined this morning medical records reviewed


Patient is noncommunicative, not able to feed, refusing to eat


Severe malnutrition


Discussed with the daughter, the need for tube feeding and possible PEG 

placement


Patient is  cachectic, severely malnourished, emaciated


Vital signs reviewed








Hospitalist Physical





- Constitutional


Vitals: 


 











Temp Pulse Resp BP Pulse Ox


 


 98 F   78   18   136/69   100 


 


 07/23/18 06:09  07/23/18 06:09  07/23/18 06:09  07/23/18 06:09  07/23/18 06:09











General appearance: Present: no acute distress, cachectic, disheveled, other (

chronically ill-looking)





- EENT


Eyes: Present: PERRL, EOM intact





- Neck


Neck: Present: supple.  Absent: enlarged thyroid





- Respiratory


Respiratory effort: normal


Respiratory: bilateral: diminished, negative: rales, rhonchi, wheezing





- Cardiovascular


Rhythm: regular


Heart Sounds: Present: S1 & S2





- Extremities


Extremities: no ischemia, No edema





- Abdominal


General gastrointestinal: soft, non-tender, non-distended, normal bowel sounds





- Integumentary


Integumentary: Present: clear, warm





- Psychiatric


Psychiatric: other (noncommunicative)





- Neurologic


Neurologic: other (noncommunicative)





Results





- Labs


CBC & Chem 7: 


 07/23/18 06:38





 07/23/18 06:38


Labs: 


 Laboratory Last Values











WBC  15.7 K/mm3 (4.5-11.0)  H  07/23/18  06:38    


 


RBC  3.76 M/mm3 (3.65-5.03)   07/23/18  06:38    


 


Hgb  9.9 gm/dl (10.1-14.3)  L  07/23/18  06:38    


 


Hct  31.8 % (30.3-42.9)   07/23/18  06:38    


 


MCV  85 fl (79-97)   07/23/18  06:38    


 


MCH  26 pg (28-32)  L  07/23/18  06:38    


 


MCHC  31 % (30-34)   07/23/18  06:38    


 


RDW  16.6 % (13.2-15.2)  H  07/23/18  06:38    


 


Plt Count  135 K/mm3 (140-440)  L  07/23/18  06:38    


 


Lymph % (Auto)  7.8 % (13.4-35.0)  L  07/22/18  18:24    


 


Mono % (Auto)  3.0 % (0.0-7.3)   07/22/18  18:24    


 


Eos % (Auto)  0.1 % (0.0-4.3)   07/22/18  18:24    


 


Baso % (Auto)  0.1 % (0.0-1.8)   07/22/18  18:24    


 


Lymph #  1.4 K/mm3 (1.2-5.4)   07/22/18  18:24    


 


Mono #  0.5 K/mm3 (0.0-0.8)   07/22/18  18:24    


 


Eos #  0.0 K/mm3 (0.0-0.4)   07/22/18  18:24    


 


Baso #  0.0 K/mm3 (0.0-0.1)   07/22/18  18:24    


 


Add Manual Diff  Complete   07/21/18  16:04    


 


Total Counted  200   07/21/18  16:04    


 


Seg Neutrophils %  89.0 % (40.0-70.0)  H  07/22/18  18:24    


 


Seg Neuts % (Manual)  97.0 % (40.0-70.0)  H  07/21/18  16:04    


 


Band Neutrophils %  0 %  07/21/18  16:04    


 


Lymphocytes % (Manual)  1.5 % (13.4-35.0)  L  07/21/18  16:04    


 


Reactive Lymphs % (Man)  0 %  07/21/18  16:04    


 


Monocytes % (Manual)  1.5 % (0.0-7.3)   07/21/18  16:04    


 


Eosinophils % (Manual)  0 % (0.0-4.3)   07/21/18  16:04    


 


Basophils % (Manual)  0 % (0.0-1.8)   07/21/18  16:04    


 


Metamyelocytes %  0 %  07/21/18  16:04    


 


Myelocytes %  0 %  07/21/18  16:04    


 


Promyelocytes %  0 %  07/21/18  16:04    


 


Blast Cells %  0 %  07/21/18  16:04    


 


Nucleated RBC %  Not Reportable   07/21/18  16:04    


 


Seg Neutrophils #  15.5 K/mm3 (1.8-7.7)  H  07/22/18  18:24    


 


Seg Neutrophils # Man  24.2 K/mm3 (1.8-7.7)  H  07/21/18  16:04    


 


Band Neutrophils #  0.0 K/mm3  07/21/18  16:04    


 


Lymphocytes # (Manual)  0.4 K/mm3 (1.2-5.4)  L  07/21/18  16:04    


 


Abs React Lymphs (Man)  0.0 K/mm3  07/21/18  16:04    


 


Monocytes # (Manual)  0.4 K/mm3 (0.0-0.8)   07/21/18  16:04    


 


Eosinophils # (Manual)  0.0 K/mm3 (0.0-0.4)   07/21/18  16:04    


 


Basophils # (Manual)  0.0 K/mm3 (0.0-0.1)   07/21/18  16:04    


 


Metamyelocytes #  0.0 K/mm3  07/21/18  16:04    


 


Myelocytes #  0.0 K/mm3  07/21/18  16:04    


 


Promyelocytes #  0.0 K/mm3  07/21/18  16:04    


 


Blast Cells #  0.0 K/mm3  07/21/18  16:04    


 


WBC Morphology  Not Reportable   07/21/18  16:04    


 


Hypersegmented Neuts  Not Reportable   07/21/18  16:04    


 


Hyposegmented Neuts  Not Reportable   07/21/18  16:04    


 


Hypogranular Neuts  Not Reportable   07/21/18  16:04    


 


Smudge Cells  Not Reportable   07/21/18  16:04    


 


Toxic Granulation  Not Reportable   07/21/18  16:04    


 


Toxic Vacuolation  Not Reportable   07/21/18  16:04    


 


Dohle Bodies  Not Reportable   07/21/18  16:04    


 


Pelger-Huet Anomaly  Not Reportable   07/21/18  16:04    


 


Becka Rods  Not Reportable   07/21/18  16:04    


 


Platelet Estimate  Consistent w auto   07/21/18  16:04    


 


Clumped Platelets  Not Reportable   07/21/18  16:04    


 


Plt Clumps, EDTA  Not Reportable   07/21/18  16:04    


 


Large Platelets  Not Reportable   07/21/18  16:04    


 


Giant Platelets  Not Reportable   07/21/18  16:04    


 


Platelet Satelliting  Not Reportable   07/21/18  16:04    


 


Plt Morphology Comment  Not Reportable   07/21/18  16:04    


 


RBC Morphology  Not Reportable   07/21/18  16:04    


 


Dimorphic RBCs  Not Reportable   07/21/18  16:04    


 


Polychromasia  Not Reportable   07/21/18  16:04    


 


Hypochromasia  Not Reportable   07/21/18  16:04    


 


Poikilocytosis  Not Reportable   07/21/18  16:04    


 


Anisocytosis  1+   07/21/18  16:04    


 


Microcytosis  Not Reportable   07/21/18  16:04    


 


Macrocytosis  Not Reportable   07/21/18  16:04    


 


Spherocytes  Not Reportable   07/21/18  16:04    


 


Pappenheimer Bodies  Not Reportable   07/21/18  16:04    


 


Sickle Cells  Not Reportable   07/21/18  16:04    


 


Target Cells  Not Reportable   07/21/18  16:04    


 


Tear Drop Cells  Not Reportable   07/21/18  16:04    


 


Ovalocytes  Not Reportable   07/21/18  16:04    


 


Helmet Cells  Not Reportable   07/21/18  16:04    


 


[place] Bodies  Not Reportable   07/21/18  16:04    


 


Cabot Rings  Not Reportable   07/21/18  16:04    


 


[place] Cells  Not Reportable   07/21/18  16:04    


 


Bite Cells  Not Reportable   07/21/18  16:04    


 


Crenated Cell  Not Reportable   07/21/18  16:04    


 


Elliptocytes  Not Reportable   07/21/18  16:04    


 


Acanthocytes (Spur)  Not Reportable   07/21/18  16:04    


 


Rouleaux  Not Reportable   07/21/18  16:04    


 


Hemoglobin C Crystals  Not Reportable   07/21/18  16:04    


 


Schistocytes  Not Reportable   07/21/18  16:04    


 


Malaria parasites  Not Reportable   07/21/18  16:04    


 


Reji Bodies  Not Reportable   07/21/18  16:04    


 


Hem Pathologist Commnt  No   07/21/18  16:04    


 


PT  18.8 Sec. (12.2-14.9)  H  07/21/18  16:04    


 


INR  1.48  (0.87-1.13)  H  07/21/18  16:04    


 


POC ABG pH  7.470  (7.35-7.45)  H  07/21/18  16:44    


 


POC ABG pCO2  42.7  (35-45)   07/21/18  16:44    


 


POC ABG pO2  156  ()  H  07/21/18  16:44    


 


POC ABG HCO3  31.1   07/21/18  16:44    


 


POC ABG Total CO2  32   07/21/18  16:44    


 


POC ABG O2 Sat  99   07/21/18  16:44    


 


POC ABG Base Excess  7   07/21/18  16:44    


 


VBG pH  7.308  (7.320-7.420)  L  07/21/18  16:04    


 


FiO2  36 %  07/21/18  16:44    


 


Sodium  150 mmol/L (137-145)  H  07/23/18  06:38    


 


Potassium  3.0 mmol/L (3.6-5.0)  L  07/23/18  06:38    


 


Chloride  107.9 mmol/L ()  H  07/23/18  06:38    


 


Carbon Dioxide  29 mmol/L (22-30)   07/23/18  06:38    


 


Anion Gap  16 mmol/L  07/23/18  06:38    


 


BUN  24 mg/dL (7-17)  H  07/23/18  06:38    


 


Creatinine  1.0 mg/dL (0.7-1.2)   07/23/18  06:38    


 


Estimated GFR  > 60 ml/min  07/23/18  06:38    


 


BUN/Creatinine Ratio  24 %  07/23/18  06:38    


 


Glucose  58 mg/dL ()  L  07/23/18  06:38    


 


POC Glucose  110  ()  H  07/22/18  15:38    


 


Hemoglobin A1c  5.4 % (4-6)   07/21/18  23:13    


 


Lactic Acid  1.80 mmol/L (0.7-2.0)   07/22/18  18:24    


 


Calcium  7.7 mg/dL (8.4-10.2)  L  07/23/18  06:38    


 


Phosphorus  2.60 mg/dL (2.5-4.5)   07/23/18  06:38    


 


Magnesium  1.60 mg/dL (1.7-2.3)  L  07/23/18  06:38    


 


Total Bilirubin  1.30 mg/dL (0.1-1.2)  H  07/23/18  06:38    


 


AST  23 units/L (5-40)   07/23/18  06:38    


 


ALT  12 units/L (7-56)   07/23/18  06:38    


 


Alkaline Phosphatase  166 units/L ()  H  07/23/18  06:38    


 


NT-Pro-B Natriuret Pep  76402 pg/mL (0-900)  H  07/21/18  16:04    


 


Total Protein  4.8 g/dL (6.3-8.2)  L  07/23/18  06:38    


 


Albumin  1.5 g/dL (3.9-5)  L  07/23/18  06:38    


 


Albumin/Globulin Ratio  0.5 %  07/23/18  06:38    


 


TSH  1.830 mlU/mL (0.270-4.200)   07/21/18  14:25    


 


Urine Color  Yudi  (Yellow)   07/21/18  15:40    


 


Urine Turbidity  Clear  (Clear)   07/21/18  15:40    


 


Urine pH  5.0  (5.0-7.0)   07/21/18  15:40    


 


Ur Specific Gravity  1.017  (1.003-1.030)   07/21/18  15:40    


 


Urine Protein  100 mg/dl mg/dL (Negative)   07/21/18  15:40    


 


Urine Glucose (UA)  Neg mg/dL (Negative)   07/21/18  15:40    


 


Urine Ketones  Neg mg/dL (Negative)   07/21/18  15:40    


 


Urine Blood  Sm  (Negative)   07/21/18  15:40    


 


Urine Nitrite  Neg  (Negative)   07/21/18  15:40    


 


Urine Bilirubin  Neg  (Negative)   07/21/18  15:40    


 


Urine Urobilinogen  2.0 mg/dL (<2.0)   07/21/18  15:40    


 


Ur Leukocyte Esterase  Mod  (Negative)   07/21/18  15:40    


 


Urine WBC (Auto)  > 182.0 /HPF (0.0-6.0)  H  07/21/18  15:40    


 


Urine RBC (Auto)  17.0 /HPF (0.0-6.0)   07/21/18  15:40    


 


U Epithel Cells (Auto)  8.0 /HPF (0-13.0)   07/21/18  15:40    


 


Urine Bacteria (Auto)  4+ /HPF (Negative)   07/21/18  15:40    


 


Urine WBC Clumps  3+ /HPF  07/21/18  15:40    


 


Urine Mucus  Few /HPF  07/21/18  15:40    


 


Urine Opiates Screen  Presumptive negative   07/21/18  15:40    


 


Urine Methadone Screen  Presumptive positive   07/21/18  15:40    


 


Ur Barbiturates Screen  Presumptive negative   07/21/18  15:40    


 


Ur Phencyclidine Scrn  Presumptive negative   07/21/18  15:40    


 


Ur Amphetamines Screen  Presumptive negative   07/21/18  15:40    


 


U Benzodiazepines Scrn  Presumptive negative   07/21/18  15:40    


 


Urine Cocaine Screen  Presumptive negative   07/21/18  15:40    


 


U Marijuana (THC) Screen  Presumptive negative   07/21/18  15:40    


 


Drugs of Abuse Note  Disclamer   07/21/18  15:40    


 


Plasma/Serum Alcohol  < 0.01 % (0-0.07)   07/21/18  17:09

## 2018-07-24 RX ADMIN — ENOXAPARIN SODIUM SCH MG: 100 INJECTION SUBCUTANEOUS at 22:46

## 2018-07-24 RX ADMIN — CEFEPIME SCH MLS/HR: 1 INJECTION, POWDER, FOR SOLUTION INTRAMUSCULAR; INTRAVENOUS at 09:29

## 2018-07-24 RX ADMIN — CEFAZOLIN SCH MLS/HR: 10 INJECTION, POWDER, FOR SOLUTION INTRAVENOUS at 13:55

## 2018-07-24 RX ADMIN — CEFAZOLIN SCH MLS/HR: 10 INJECTION, POWDER, FOR SOLUTION INTRAVENOUS at 23:14

## 2018-07-24 RX ADMIN — FAMOTIDINE SCH MG: 10 INJECTION, SOLUTION INTRAVENOUS at 10:37

## 2018-07-24 RX ADMIN — MORPHINE SULFATE PRN MG: 2 INJECTION, SOLUTION INTRAMUSCULAR; INTRAVENOUS at 07:58

## 2018-07-24 RX ADMIN — POTASSIUM CHLORIDE SCH: 1500 TABLET, EXTENDED RELEASE ORAL at 10:54

## 2018-07-24 RX ADMIN — GABAPENTIN SCH MG: 300 CAPSULE ORAL at 10:37

## 2018-07-24 RX ADMIN — METOPROLOL TARTRATE SCH: 25 TABLET, FILM COATED ORAL at 10:53

## 2018-07-24 RX ADMIN — Medication SCH ML: at 10:41

## 2018-07-24 RX ADMIN — GABAPENTIN SCH MG: 300 CAPSULE ORAL at 22:45

## 2018-07-24 RX ADMIN — FUROSEMIDE SCH MG: 20 TABLET ORAL at 10:40

## 2018-07-24 RX ADMIN — ASPIRIN SCH MG: 81 TABLET, CHEWABLE ORAL at 10:40

## 2018-07-24 RX ADMIN — METOPROLOL TARTRATE SCH: 25 TABLET, FILM COATED ORAL at 22:56

## 2018-07-24 RX ADMIN — SODIUM CHLORIDE SCH MLS/HR: 0.9 INJECTION, SOLUTION INTRAVENOUS at 11:48

## 2018-07-24 NOTE — PROGRESS NOTE
Assessment and Plan





Assessment: 


1) Sepsis: better; etiology - Staph bacteremia


2) MSSA bacteremia: source likely sacral decubitus vs. pneumonia


   -Blood cx 7/21 MSSA 1 of 4 bottles


   -Blood cx 7/23 no growth so far


   -TTE EF 15-20%, severe MR/TR, no vegetations 


3) Non stageable sacral ulcer 4x7.5x0.1cm and Non stageable right buttocks 

ucler 4x3x0.1: ? infected


   -CT pelvis NO osteo NO abscess


4) Bilateral pneumonia pneumonia


5) Diabetes


6) Acute encephalopathy 


7) Congestive heart failure


8) Right sided pneumothorax 25%


 


 


Plan:


-pneumothorax per IMS


-stop contact


-stop vanco and cefepime 


-start cefazolin 


-f/u repeat blood cx 


-upon discharge will do cefazolin 2 g IV q8h total 14 days until 8/5/18


-Consider hospice, poor prognosis with Staph septicemia, sacral dec and severe 

cardiomyopathy 





Thank you for your consultation, will follow up with you. 





Alicia Ramirez MD


Infectious Diseases Specialist


[facility] ([facility]


M [phone]


O [phone]





Subjective


Date of service: 07/24/18


Principal diagnosis: Staph bacteremia


Interval history: 


Remains non verbal in NAD





Microbiology:





Blood cultures:


7/21 MSSA 1 of 4 


7/23 ngtd





Urine cultures:





Respiratory cultures:


 





Current Antimicrobials:


Vancomycin


Cefepime





Objective





- Exam


Narrative Exam: 





General appearance: Alert in NAD,non  conversant 


Eyes: anicteric sclerae, moist conjunctivae; no lid-lag; PERRLA 


HENT: Atraumatic; oropharynx limited edentulous


Neck: Trachea midline; supple, no thyromegaly or lymphadenopathy 


Lungs: distant BS nunu


CV: irreg


Abdomen: Soft, non-tender 


Extremities: No peripheral edema or extremity lymphadenopathy


Skin:  


NON-STAGEABLE PRESSURE INJURY TO SACRAL AREA-ULCER MEASURES 4X7.5X0.1-NO 

DRAINAGE PRESENT-NECROTIC TISSUE PRESENT TO MAJORITY OF ULCER-HYDROCOLLOID 

APPLIED TO ULCER TO PROMOTE AUTOLYTIC DEBRIDEMENT





NON-STAGEABLE PRESSURE INJURY TO RIGHT BUTTOCKS-ULCER MEASURES 4X3X0.1-NO 

DRAINAGE PRESENT-NECROTIC TISSUE TO MAJORITY OF ULCER-HYDROCOLLOID APPLIED TO 

PROMOTE AUTOLYTIC DEBRIDEMENT 





Psych: Appropriate affect, alert and oriented to person, place and time. Neuro: 

alert and oriented x 3. Moving all extermities


Lines: No CVL / PICC











- Constitutional


Vitals: 


 Vital Signs











Temp Pulse Resp BP Pulse Ox


 


 97.8 F   93 H  18   138/69   100 


 


 07/24/18 07:30  07/24/18 10:53  07/24/18 07:30  07/24/18 10:53  07/24/18 07:30








 Temperature -Last 24 Hours











Temperature                    97.8 F


 


Temperature                    96.9 F


 


Temperature                    98.0 F


 


Temperature                    98.4 F


 


Temperature                    98.4 F

















- Labs


CBC & Chem 7: 


 07/23/18 06:38





 07/23/18 06:38


Labs: 


 Abnormal lab results











  07/23/18 07/23/18 07/24/18 Range/Units





  10:08 22:37 10:49 


 


POC Glucose  69 L   233 H  ()  


 


C-Reactive Protein   25.70 H   (0.00-1.30)  mg/dL

## 2018-07-24 NOTE — CAT SCAN REPORT
CT ABDOMEN PELVIS WITHOUT CONTRAST:



HISTORY:  Staph septicemia, sacral decubitus ulcer.



COMPARISON: 2/17/18.



TECHNIQUE:  Helical CT in 1.25mm intervals without IV contrast. 

Sagittal and coronal reconstructions. 





FINDINGS:



Lung bases: A small to moderate right pneumothorax is identified at the 

right lung base measuring up to 2.2 cm in thickness. This appears to be 

a new finding. Small bilateral layering pleural effusions are 

identified. There are bilateral lower lobe infiltrates or atelectasis. 

Heart size is normal.



Liver: Normal.



Biliary system: Multiple small and large calcified gallstones are 

identified in the fundus of the gallbladder. No biliary dilatation or 

inflammation is appreciated.



Pancreas: Normal.



Spleen: Within normal limits. Scattered calcified granulomas.



Kidneys/ureters/bladder: 3 stones at the inferior pole the left kidney 

are unchanged measuring up to 5 mm. The kidneys, ureters and bladder 

are unremarkable otherwise.



Adrenal glands: Normal.



Aorta: Mild diffuse calcifications. No aneurysm.



Intestines: No oral contrast was administered which limits this exam. 

No evidence for bowel obstruction. Diverticulosis of the cecum and 

sigmoid colon are noted. No obvious focal inflammation or mass.



Appendix: Normal.



Pelvic viscera: Normal.



Ascites: None.



Adenopathy: None.



Musculoskeletal: Mild osteopenia and degenerative changes. No 

suspicious bony lesion or acute fracture. No large decubitus ulcer 

extending to bone is identified. No signs of osteomyelitis.





IMPRESSION:

Right pneumothorax estimated at 25%.

Bibasilar infiltrates or atelectasis. Correlate for aspiration. Small 

layering pleural effusions are also identified.

Cholelithiasis.

Left nephrolithiasis, nonobstructing.

Mild diverticulosis of the colon.



These findings were relayed to the patient's RN, Frantz, at 0910 hours

## 2018-07-24 NOTE — PROGRESS NOTE
Assessment and Plan


Assessment and plan: 





--Right pneumothorax (25%)


Family will hold off on chest tube for now and would like to discuss amongst 

themselves first.


I explained the importance of chest tube and the procedure in detail.





--Staph aureus bacteremia; 1:2 blood cultures,ID following


Cefazolin per ID.  Continue 2 g IV every 8 hours for total of 14 days until 8/5/ 18.





--Stage IV decubitus ulcer; wound care,IV antibiotics


Surgery following, possible surgical debridement if needed





--Sepsis;due to UTI


Continue Vanco and Zosyn, follow cultures





--Lactic acidosis; secondary to sepsis


Continue current management





--Metabolic encephalopathy; probably due to sepsis


Patient is noncommunicative





--Acute on chronic systolic congestive heart failure; EF 10-15%


Continue anti-failure medications, cardiology evaluation





--History of coronary artery disease status post CABG; 


continue current cardiac medications, cardiology evaluation if needed





--Severe malnutrition/hypoalbuminemia; 


nutrition supplement and supportive care, Nutrition consult 


poor oral intake, discussed with her daughter, agreed for Dobbhoff feeding 


Patient may need PEG placement at some point





--Code status discussed with the daughter at the bedside, will check with the 

family and inform us





--Full code at this point





Patient is critically ill with poor prognosis


Plan of care reviewed with the patient's daughter at the bedside and her nurse





Disposition; follow consultations and recommendations


Poor prognosis.  I discuss possibility of hospice with the family.








History


Interval history: 





68-year-old female patient with significant history of congestive heart failure 

and diabetes mellitus 


sacral decubitus ulcers was admitted through emergency room with altered level 

of consciousness 


and failure to thrive with poor oral intake.  Patient is being symptomatically 

managed with empiric antibiotics, 


surgery evaluated the patient, possible surgical debridement if needed, blood 

cultures 1;2 positive for staph aureus





Hospitalist Physical





- Constitutional


Vitals: 


 











Temp Pulse Resp BP Pulse Ox


 


 97.8 F   93 H  18   138/69   100 


 


 07/24/18 07:30  07/24/18 10:53  07/24/18 07:30  07/24/18 10:53  07/24/18 07:30











General appearance: Present: mild distress, cachectic, disheveled, other (

chronically ill-looking)





- EENT


Eyes: Present: PERRL, EOM intact


ENT: hearing intact, clear oral mucosa, dentition normal





- Neck


Neck: Present: supple, normal ROM





- Respiratory


Respiratory effort: normal


Respiratory: bilateral: CTA





- Cardiovascular


Rhythm: regular


Heart Sounds: Present: S1 & S2.  Absent: gallop, rub





- Extremities


Extremities: no ischemia, No edema, Full ROM





- Abdominal


General gastrointestinal: soft, non-tender, non-distended, normal bowel sounds





- Integumentary


Integumentary: Present: clear, warm, dry





- Neurologic


Neurologic: CNII-XII intact, moves all extremities





Results





- Labs


CBC & Chem 7: 


 07/23/18 06:38





 07/23/18 06:38


Labs: 


 Laboratory Last Values











WBC  15.7 K/mm3 (4.5-11.0)  H  07/23/18  06:38    


 


RBC  3.76 M/mm3 (3.65-5.03)   07/23/18  06:38    


 


Hgb  9.9 gm/dl (10.1-14.3)  L  07/23/18  06:38    


 


Hct  31.8 % (30.3-42.9)   07/23/18  06:38    


 


MCV  85 fl (79-97)   07/23/18  06:38    


 


MCH  26 pg (28-32)  L  07/23/18  06:38    


 


MCHC  31 % (30-34)   07/23/18  06:38    


 


RDW  16.6 % (13.2-15.2)  H  07/23/18  06:38    


 


Plt Count  135 K/mm3 (140-440)  L  07/23/18  06:38    


 


Lymph % (Auto)  7.8 % (13.4-35.0)  L  07/22/18  18:24    


 


Mono % (Auto)  3.0 % (0.0-7.3)   07/22/18  18:24    


 


Eos % (Auto)  0.1 % (0.0-4.3)   07/22/18  18:24    


 


Baso % (Auto)  0.1 % (0.0-1.8)   07/22/18  18:24    


 


Lymph #  1.4 K/mm3 (1.2-5.4)   07/22/18  18:24    


 


Mono #  0.5 K/mm3 (0.0-0.8)   07/22/18  18:24    


 


Eos #  0.0 K/mm3 (0.0-0.4)   07/22/18  18:24    


 


Baso #  0.0 K/mm3 (0.0-0.1)   07/22/18  18:24    


 


Add Manual Diff  Complete   07/23/18  06:38    


 


Total Counted  100   07/23/18  06:38    


 


Seg Neutrophils %  89.0 % (40.0-70.0)  H  07/22/18  18:24    


 


Seg Neuts % (Manual)  95.0 % (40.0-70.0)  H  07/23/18  06:38    


 


Band Neutrophils %  2.0 %  07/23/18  06:38    


 


Lymphocytes % (Manual)  1.0 % (13.4-35.0)  L  07/23/18  06:38    


 


Reactive Lymphs % (Man)  0 %  07/23/18  06:38    


 


Monocytes % (Manual)  1.0 % (0.0-7.3)   07/23/18  06:38    


 


Eosinophils % (Manual)  0 % (0.0-4.3)   07/23/18  06:38    


 


Basophils % (Manual)  0 % (0.0-1.8)   07/23/18  06:38    


 


Metamyelocytes %  1.0 %  07/23/18  06:38    


 


Myelocytes %  0 %  07/23/18  06:38    


 


Promyelocytes %  0 %  07/23/18  06:38    


 


Blast Cells %  0 %  07/23/18  06:38    


 


Nucleated RBC %  Not Reportable   07/23/18  06:38    


 


Seg Neutrophils #  15.5 K/mm3 (1.8-7.7)  H  07/22/18  18:24    


 


Seg Neutrophils # Man  14.9 K/mm3 (1.8-7.7)  H  07/23/18  06:38    


 


Band Neutrophils #  0.3 K/mm3  07/23/18  06:38    


 


Lymphocytes # (Manual)  0.2 K/mm3 (1.2-5.4)  L  07/23/18  06:38    


 


Abs React Lymphs (Man)  0.0 K/mm3  07/23/18  06:38    


 


Monocytes # (Manual)  0.2 K/mm3 (0.0-0.8)   07/23/18  06:38    


 


Eosinophils # (Manual)  0.0 K/mm3 (0.0-0.4)   07/23/18  06:38    


 


Basophils # (Manual)  0.0 K/mm3 (0.0-0.1)   07/23/18  06:38    


 


Metamyelocytes #  0.2 K/mm3  07/23/18  06:38    


 


Myelocytes #  0.0 K/mm3  07/23/18  06:38    


 


Promyelocytes #  0.0 K/mm3  07/23/18  06:38    


 


Blast Cells #  0.0 K/mm3  07/23/18  06:38    


 


WBC Morphology  Not Reportable   07/23/18  06:38    


 


Hypersegmented Neuts  Not Reportable   07/23/18  06:38    


 


Hyposegmented Neuts  Not Reportable   07/23/18  06:38    


 


Hypogranular Neuts  Not Reportable   07/23/18  06:38    


 


Smudge Cells  Not Reportable   07/23/18  06:38    


 


Toxic Granulation  Not Reportable   07/23/18  06:38    


 


Toxic Vacuolation  Not Reportable   07/23/18  06:38    


 


Dohle Bodies  Not Reportable   07/23/18  06:38    


 


Pelger-Huet Anomaly  Not Reportable   07/23/18  06:38    


 


Becka Rods  Not Reportable   07/23/18  06:38    


 


Platelet Estimate  Cons   07/23/18  06:38    


 


Clumped Platelets  Not Reportable   07/23/18  06:38    


 


Plt Clumps, EDTA  Not Reportable   07/23/18  06:38    


 


Large Platelets  Not Reportable   07/23/18  06:38    


 


Giant Platelets  Not Reportable   07/23/18  06:38    


 


Platelet Satelliting  Not Reportable   07/23/18  06:38    


 


Plt Morphology Comment  Not Reportable   07/23/18  06:38    


 


RBC Morphology  Not Reportable   07/23/18  06:38    


 


Dimorphic RBCs  Not Reportable   07/23/18  06:38    


 


Polychromasia  Not Reportable   07/23/18  06:38    


 


Hypochromasia  Few   07/23/18  06:38    


 


Poikilocytosis  Not Reportable   07/23/18  06:38    


 


Anisocytosis  1+   07/23/18  06:38    


 


Microcytosis  Not Reportable   07/23/18  06:38    


 


Macrocytosis  Not Reportable   07/23/18  06:38    


 


Spherocytes  Not Reportable   07/23/18  06:38    


 


Pappenheimer Bodies  Not Reportable   07/23/18  06:38    


 


Sickle Cells  Not Reportable   07/23/18  06:38    


 


Target Cells  Not Reportable   07/23/18  06:38    


 


Tear Drop Cells  Not Reportable   07/23/18  06:38    


 


Ovalocytes  Not Reportable   07/23/18  06:38    


 


Helmet Cells  Not Reportable   07/23/18  06:38    


 


Daniels-Shasta Bodies  Not Reportable   07/23/18  06:38    


 


Cabot Rings  Not Reportable   07/23/18  06:38    


 


Lalo Cells  Not Reportable   07/23/18  06:38    


 


Bite Cells  Not Reportable   07/23/18  06:38    


 


Crenated Cell  Not Reportable   07/23/18  06:38    


 


Elliptocytes  Not Reportable   07/23/18  06:38    


 


Acanthocytes (Spur)  Not Reportable   07/23/18  06:38    


 


Rouleaux  Not Reportable   07/23/18  06:38    


 


Hemoglobin C Crystals  Not Reportable   07/23/18  06:38    


 


Schistocytes  Not Reportable   07/23/18  06:38    


 


Malaria parasites  Not Reportable   07/23/18  06:38    


 


Reji Bodies  Not Reportable   07/23/18  06:38    


 


Hem Pathologist Commnt  No   07/23/18  06:38    


 


PT  18.8 Sec. (12.2-14.9)  H  07/21/18  16:04    


 


INR  1.48  (0.87-1.13)  H  07/21/18  16:04    


 


POC ABG pH  7.470  (7.35-7.45)  H  07/21/18  16:44    


 


POC ABG pCO2  42.7  (35-45)   07/21/18  16:44    


 


POC ABG pO2  156  ()  H  07/21/18  16:44    


 


POC ABG HCO3  31.1   07/21/18  16:44    


 


POC ABG Total CO2  32   07/21/18  16:44    


 


POC ABG O2 Sat  99   07/21/18  16:44    


 


POC ABG Base Excess  7   07/21/18  16:44    


 


VBG pH  7.308  (7.320-7.420)  L  07/21/18  16:04    


 


FiO2  36 %  07/21/18  16:44    


 


Sodium  150 mmol/L (137-145)  H  07/23/18  06:38    


 


Potassium  3.0 mmol/L (3.6-5.0)  L  07/23/18  06:38    


 


Chloride  107.9 mmol/L ()  H  07/23/18  06:38    


 


Carbon Dioxide  29 mmol/L (22-30)   07/23/18  06:38    


 


Anion Gap  16 mmol/L  07/23/18  06:38    


 


BUN  24 mg/dL (7-17)  H  07/23/18  06:38    


 


Creatinine  1.0 mg/dL (0.7-1.2)   07/23/18  06:38    


 


Estimated GFR  > 60 ml/min  07/23/18  06:38    


 


BUN/Creatinine Ratio  24 %  07/23/18  06:38    


 


Glucose  58 mg/dL ()  L  07/23/18  06:38    


 


POC Glucose  233  ()  H  07/24/18  10:49    


 


Hemoglobin A1c  5.4 % (4-6)   07/21/18  23:13    


 


Lactic Acid  1.80 mmol/L (0.7-2.0)   07/22/18  18:24    


 


Calcium  7.7 mg/dL (8.4-10.2)  L  07/23/18  06:38    


 


Phosphorus  2.60 mg/dL (2.5-4.5)   07/23/18  06:38    


 


Magnesium  1.60 mg/dL (1.7-2.3)  L  07/23/18  06:38    


 


Total Bilirubin  1.30 mg/dL (0.1-1.2)  H  07/23/18  06:38    


 


AST  23 units/L (5-40)   07/23/18  06:38    


 


ALT  12 units/L (7-56)   07/23/18  06:38    


 


Alkaline Phosphatase  166 units/L ()  H  07/23/18  06:38    


 


C-Reactive Protein  25.70 mg/dL (0.00-1.30)  H  07/23/18  22:37    


 


NT-Pro-B Natriuret Pep  30666 pg/mL (0-900)  H  07/21/18  16:04    


 


Total Protein  4.8 g/dL (6.3-8.2)  L  07/23/18  06:38    


 


Albumin  1.5 g/dL (3.9-5)  L  07/23/18  06:38    


 


Albumin/Globulin Ratio  0.5 %  07/23/18  06:38    


 


TSH  1.830 mlU/mL (0.270-4.200)   07/21/18  14:25    


 


Urine Color  Yudi  (Yellow)   07/21/18  15:40    


 


Urine Turbidity  Clear  (Clear)   07/21/18  15:40    


 


Urine pH  5.0  (5.0-7.0)   07/21/18  15:40    


 


Ur Specific Gravity  1.017  (1.003-1.030)   07/21/18  15:40    


 


Urine Protein  100 mg/dl mg/dL (Negative)   07/21/18  15:40    


 


Urine Glucose (UA)  Neg mg/dL (Negative)   07/21/18  15:40    


 


Urine Ketones  Neg mg/dL (Negative)   07/21/18  15:40    


 


Urine Blood  Sm  (Negative)   07/21/18  15:40    


 


Urine Nitrite  Neg  (Negative)   07/21/18  15:40    


 


Urine Bilirubin  Neg  (Negative)   07/21/18  15:40    


 


Urine Urobilinogen  2.0 mg/dL (<2.0)   07/21/18  15:40    


 


Ur Leukocyte Esterase  Mod  (Negative)   07/21/18  15:40    


 


Urine WBC (Auto)  > 182.0 /HPF (0.0-6.0)  H  07/21/18  15:40    


 


Urine RBC (Auto)  17.0 /HPF (0.0-6.0)   07/21/18  15:40    


 


U Epithel Cells (Auto)  8.0 /HPF (0-13.0)   07/21/18  15:40    


 


Urine Bacteria (Auto)  4+ /HPF (Negative)   07/21/18  15:40    


 


Urine WBC Clumps  3+ /HPF  07/21/18  15:40    


 


Urine Mucus  Few /HPF  07/21/18  15:40    


 


Urine Opiates Screen  Presumptive negative   07/21/18  15:40    


 


Urine Methadone Screen  Presumptive positive   07/21/18  15:40    


 


Ur Barbiturates Screen  Presumptive negative   07/21/18  15:40    


 


Ur Phencyclidine Scrn  Presumptive negative   07/21/18  15:40    


 


Ur Amphetamines Screen  Presumptive negative   07/21/18  15:40    


 


U Benzodiazepines Scrn  Presumptive negative   07/21/18  15:40    


 


Urine Cocaine Screen  Presumptive negative   07/21/18  15:40    


 


U Marijuana (THC) Screen  Presumptive negative   07/21/18  15:40    


 


Drugs of Abuse Note  Disclamer   07/21/18  15:40    


 


Plasma/Serum Alcohol  < 0.01 % (0-0.07)   07/21/18  17:09

## 2018-07-24 NOTE — EVENT NOTE
Date: 07/24/18


Late note: 


Notified around 930am by Dr. Edwards of finding of Left sided pneumothorax on 

CT A/P which was ordered by ID. ?etiology. I recommended IR consult for 

placement of pigtail catheter. 





Per hospitalist note, patient's family is choosing to have a discussion prior 

to placement of chest tube. Continue supplemental O2, repeat CXR in am.

## 2018-07-25 LAB
BUN SERPL-MCNC: 22 MG/DL (ref 7–17)
BUN/CREAT SERPL: 28 %
CALCIUM SERPL-MCNC: 7.6 MG/DL (ref 8.4–10.2)
HEMOLYSIS INDEX: 4

## 2018-07-25 RX ADMIN — Medication SCH ML: at 04:36

## 2018-07-25 RX ADMIN — GABAPENTIN SCH: 300 CAPSULE ORAL at 01:09

## 2018-07-25 RX ADMIN — CEFAZOLIN SCH MLS/HR: 10 INJECTION, POWDER, FOR SOLUTION INTRAVENOUS at 22:31

## 2018-07-25 RX ADMIN — POTASSIUM CHLORIDE SCH MEQ: 1500 TABLET, EXTENDED RELEASE ORAL at 11:26

## 2018-07-25 RX ADMIN — FAMOTIDINE SCH MG: 10 INJECTION, SOLUTION INTRAVENOUS at 11:25

## 2018-07-25 RX ADMIN — ASPIRIN SCH: 81 TABLET, CHEWABLE ORAL at 12:53

## 2018-07-25 RX ADMIN — CEFAZOLIN SCH MLS/HR: 10 INJECTION, POWDER, FOR SOLUTION INTRAVENOUS at 14:34

## 2018-07-25 RX ADMIN — ENOXAPARIN SODIUM SCH MG: 100 INJECTION SUBCUTANEOUS at 22:32

## 2018-07-25 RX ADMIN — METOPROLOL TARTRATE SCH: 25 TABLET, FILM COATED ORAL at 22:32

## 2018-07-25 RX ADMIN — POTASSIUM CHLORIDE SCH MEQ: 1.5 POWDER, FOR SOLUTION ORAL at 21:01

## 2018-07-25 RX ADMIN — GABAPENTIN SCH MG: 300 CAPSULE ORAL at 11:26

## 2018-07-25 RX ADMIN — POTASSIUM CHLORIDE SCH MEQ: 1.5 POWDER, FOR SOLUTION ORAL at 14:34

## 2018-07-25 RX ADMIN — Medication SCH ML: at 22:40

## 2018-07-25 RX ADMIN — SODIUM CHLORIDE SCH MLS/HR: 0.9 INJECTION, SOLUTION INTRAVENOUS at 04:35

## 2018-07-25 RX ADMIN — Medication SCH: at 01:08

## 2018-07-25 RX ADMIN — Medication SCH ML: at 14:04

## 2018-07-25 RX ADMIN — METOPROLOL TARTRATE SCH MG: 25 TABLET, FILM COATED ORAL at 11:26

## 2018-07-25 RX ADMIN — CEFAZOLIN SCH MLS/HR: 10 INJECTION, POWDER, FOR SOLUTION INTRAVENOUS at 05:56

## 2018-07-25 RX ADMIN — GABAPENTIN SCH MG: 300 CAPSULE ORAL at 22:32

## 2018-07-25 NOTE — CONSULTATION
History of Present Illness


Consult date: 07/25/18


Requesting physician: AMMON PERRY


Consult reason: congestive heart failure


History of present illness: 


The pt is a 67 YO female with a past medical history significant for CAD s/p 

CABG and PCI, recent STEMI on 1/3/2018 - [facility] showed triple vessel CAD and 

severe lv systolic dysfunction with EF 25%, pt declined re-do CABG at [facility], 

chronic combined systolic and diastolic HF, ICMP, sarcoidosis, rheumatoid 

arthritis, decubitus sacral ulcer. She is followed in our office by Dr. Short. 

She presented on 7/21 with complaints of decreased oral intake and AMS noted by 

her daughter. Since admission, she has been diagnosed with staph aureus 

bacteremia, sepsis, UTI, lactic acidosis, encephalopathy, malnutrition. She was 

incidentally found to have right pneumothorax (20%) and is pending possible 

chest tube placement. Cardiology has been consulted for heart failure. 





Echo done 7/22/2018 showed EF 15-20%, LV severely dilated, mild to mod LVH, LA 

mildly dilated, mod to severe MR, mild AR, mild to mod TR. 








Past History


Past Medical History: acute MI, CAD, COPD, diabetes, GERD, heart failure, 

hypertension, stroke, other


Past Surgical History: CABG


Social history: no significant social history


Family history: no significant family history





Medications and Allergies


 Allergies











Allergy/AdvReac Type Severity Reaction Status Date / Time


 


atorvastatin calcium Allergy  Unknown Verified 07/24/18 21:55





[From Lipitor]     


 


Iodinated Contrast- Oral and Allergy  Unknown Verified 07/24/18 21:55





IV Dye     





[Iodinated Contrast Media -     





IV Dye]     


 


tomato Allergy  Unknown Verified 07/24/18 21:55











 Home Medications











 Medication  Instructions  Recorded  Confirmed  Last Taken  Type


 


Gabapentin 300 mg PO BID 12/09/14 07/21/18 05/18/16 History


 


Aspirin [Aspirin BABY CHEW TAB] 81 mg PO QDAY #30 tab.chew 02/22/18 07/21/18 

Unknown Rx


 


Furosemide [Lasix TAB] 20 mg PO QDAY #30 tablet 02/22/18 07/21/18 Unknown Rx


 


Metoprolol [Lopressor TAB] 12.5 mg PO BID #30 tablet 02/22/18 07/21/18 Unknown 

Rx


 


traMADol [Ultram 50 MG tab] 50 mg PO BID #60 tablet 02/22/18 07/21/18 Unknown Rx


 


Potassium Chloride 2 tab PO DAILY 14 Days #28 04/27/18 07/21/18 Unknown Rx





 tablet.er    


 


traMADol [Ultram 50 MG tab] 50 mg PO Q6HR PRN #20 tablet 04/27/18 07/21/18 

Unknown Rx











Active Meds: 


Active Medications





Acetaminophen (Tylenol)  650 mg PO Q4H PRN


   PRN Reason: Pain MILD(1-3)/Fever >100.5/HA


Lipase/Protease/Amylase (Pancreaze Dr 10,500 Unit)  1 each FEEDTUBE PRN PRN


   PRN Reason: For Clogged Feeding Tube


Aspirin (Baby Aspirin)  81 mg PO QDAY [facility]


   Last Admin: 07/25/18 12:53 Dose:  Not Given


Enoxaparin Sodium (Lovenox)  30 mg SUB-Q QDAY@2200 [facility]


   Last Admin: 07/24/18 22:46 Dose:  30 mg


Famotidine (Pepcid)  20 mg PO DAILY [facility]


Furosemide (Lasix)  40 mg IV QDAY [facility]


Gabapentin (Neurontin)  300 mg PO BID [facility]


   Last Admin: 07/25/18 11:26 Dose:  300 mg


Sodium Chloride (Nacl 0.9% 1000 Ml)  1,000 mls @ 75 mls/hr IV AS DIRECT [facility]


   Last Admin: 07/25/18 04:35 Dose:  75 mls/hr


Cefazolin Sodium 2 gm/ Sodium (Chloride)  100 mls @ 200 mls/hr IV Q8HR [facility]


   Stop: 08/05/18 23:59


   Last Admin: 07/25/18 05:56 Dose:  200 mls/hr


Insulin Human Regular (Humulin R)  0 units SUB-Q Q6HR [facility]; Protocol


   Last Admin: 07/25/18 12:54 Dose:  Not Given


Metoprolol Tartrate (Lopressor)  12.5 mg PO BID [facility]


   Last Admin: 07/25/18 11:26 Dose:  12.5 mg


Morphine Sulfate (Morphine)  2 mg IV Q4H PRN


   PRN Reason: Pain, Moderate (4-6)


   Last Admin: 07/24/18 07:58 Dose:  2 mg


Ondansetron HCl (Zofran)  4 mg IV Q8H PRN


   PRN Reason: Nausea And Vomiting


Potassium Chloride (K-Dur)  20 meq PO QDAY [facility]


   Last Admin: 07/25/18 11:26 Dose:  20 meq


Simple Syrup (Simple Syrup)  15 ml FEEDTUBE PRN PRN


   PRN Reason: Hypoglycemia


Simple Syrup (Simple Syrup)  30 ml FEEDTUBE PRN PRN


   PRN Reason: Hypoglycemia


Sodium Bicarbonate (Sodium Bicarbonate)  325 mg FEEDTUBE PRN PRN


   PRN Reason: For Clogged Feeding Tube


Sodium Chloride (Sodium Chloride Flush Syringe 10 Ml)  10 ml IV BID [facility]


   Last Admin: 07/25/18 04:36 Dose:  10 ml


Sodium Chloride (Sodium Chloride Flush Syringe 10 Ml)  10 ml IV PRN PRN


   PRN Reason: LINE FLUSH











Physical Examination


 Vital Signs











Pulse Resp


 


 115 H  19 


 


 07/21/18 13:59  07/21/18 13:59














Results





 07/23/18 06:38





 07/25/18 12:04





Assessment and Plan


Assessment:


Sepsis / staph aureus bacteremia


UTI


Lactic acidosis


Encephalopathy


Pneumothorax


Stage IV decubitus ulcer


CAD s/p CABG and PCI


H/o recent STEMI on 1/3/2018 - [facility] showed triple vessel CAD and severe lv 

systolic dysfunction with EF 25%, pt declined re-do CABG at [facility]


Chronic combined systolic and diastolic heart failure 


ICMP - EF 40-45% per echo 1/5/2018 and 15-20% per echo 7/22/2018


H/o sarcoidosis


H/o rheumatoid arthritis


Hypernatremia


Hypokalemia / hypomag





Plan:


Resume home losartan, pravastatin, plavix. Cont all other present cardiac 

management.


Replete K+ and Mg and repeat BMP and Mg in AM.  





The patient has been seen in conjunction with Dr. Redmond who agrees with the 

assessment and plan of care.

## 2018-07-25 NOTE — XRAY REPORT
AP CHEST:



HISTORY: Followup pneumothorax



A small to medium right pneumothorax is identified measuring up to 1.5 

cm at the lateral right lung base. The pneumothorax is estimated at 

20%. Small pleural effusions and bibasilar atelectasis are unchanged. 

Heart size remains within normal limits. The feeding tube terminates in 

the mid stomach.



IMPRESSION:

No overwhelming change in the right pneumothorax since yesterday's CT.

Small pleural effusions and bibasilar atelectasis.

## 2018-07-25 NOTE — PROGRESS NOTE
Assessment and Plan


Assessment and plan: 





--Right pneumothorax (25%)


Family agreeable to chest tube.  IR consulted.  Pulmonary also consulted.





--Staph aureus bacteremia; 1:2 blood cultures,ID following


Cefazolin per ID.  Continue 2 g IV every 8 hours for total of 14 days until 8/5/ 18.





--Stage IV decubitus ulcer; wound care,IV antibiotics


Surgery following, possible surgical debridement if needed





--Sepsis;due to UTI


Continue Vanco and Zosyn, follow cultures





--Lactic acidosis; secondary to sepsis


Continue current management





--Metabolic encephalopathy; probably due to sepsis


Patient is noncommunicative





--Acute on chronic systolic congestive heart failure; EF 10-15%


Continue anti-failure medications, cardiology consultation pending





--Cardiomyopathy.





--Severe MR/TR





--History of coronary artery disease status post CABG; 


continue current cardiac medications, cardiology consultation pending





--Severe malnutrition/hypoalbuminemia; 


nutrition supplement and supportive care, Nutrition consult 


poor oral intake, discussed with her daughter, agreed for Dobbhoff feeding 


Patient may need PEG placement at some point





--Code status discussed with the daughter at the bedside, will check with the 

family and inform us





--Full code at this point





Patient is critically ill with poor prognosis


Plan of care reviewed with the patient's daughter at the bedside and her nurse





Disposition; follow consultations and recommendations


Poor prognosis.  I discuss possibility of hospice with the family.








History


Interval history: 





68-year-old female patient with significant history of congestive heart failure 

and diabetes mellitus 


sacral decubitus ulcers was admitted through emergency room with altered level 

of consciousness 


and failure to thrive with poor oral intake.  Patient is being symptomatically 

managed with empiric antibiotics, 


surgery evaluated the patient, possible surgical debridement if needed, blood 

cultures 1;2 positive for staph aureus


Patient also noted incidental finding on CT scan of 25% right pneumothorax.





Hospitalist Physical





- Constitutional


Vitals: 


 











Temp Pulse Resp BP Pulse Ox


 


 98.8 F   102 H  20   141/70   100 


 


 07/25/18 07:40  07/25/18 07:40  07/25/18 07:40  07/25/18 07:40  07/25/18 07:40











General appearance: Present: mild distress, cachectic, disheveled, other (

chronically ill-looking)





- EENT


Eyes: Present: PERRL, EOM intact


ENT: hearing intact, clear oral mucosa, dentition normal





- Neck


Neck: Present: supple, normal ROM





- Respiratory


Respiratory effort: normal


Respiratory: bilateral: CTA





- Cardiovascular


Rhythm: regular


Heart Sounds: Present: S1 & S2.  Absent: gallop, rub





- Extremities


Extremities: no ischemia, No edema, Full ROM





- Abdominal


General gastrointestinal: soft, non-tender, non-distended, normal bowel sounds





- Integumentary


Integumentary: Present: clear, warm, dry





- Neurologic


Neurologic: CNII-XII intact, moves all extremities





Results





- Labs


CBC & Chem 7: 


 07/23/18 06:38





 07/23/18 06:38


Labs: 


 Laboratory Last Values











WBC  15.7 K/mm3 (4.5-11.0)  H  07/23/18  06:38    


 


RBC  3.76 M/mm3 (3.65-5.03)   07/23/18  06:38    


 


Hgb  9.9 gm/dl (10.1-14.3)  L  07/23/18  06:38    


 


Hct  31.8 % (30.3-42.9)   07/23/18  06:38    


 


MCV  85 fl (79-97)   07/23/18  06:38    


 


MCH  26 pg (28-32)  L  07/23/18  06:38    


 


MCHC  31 % (30-34)   07/23/18  06:38    


 


RDW  16.6 % (13.2-15.2)  H  07/23/18  06:38    


 


Plt Count  135 K/mm3 (140-440)  L  07/23/18  06:38    


 


Lymph % (Auto)  7.8 % (13.4-35.0)  L  07/22/18  18:24    


 


Mono % (Auto)  3.0 % (0.0-7.3)   07/22/18  18:24    


 


Eos % (Auto)  0.1 % (0.0-4.3)   07/22/18  18:24    


 


Baso % (Auto)  0.1 % (0.0-1.8)   07/22/18  18:24    


 


Lymph #  1.4 K/mm3 (1.2-5.4)   07/22/18  18:24    


 


Mono #  0.5 K/mm3 (0.0-0.8)   07/22/18  18:24    


 


Eos #  0.0 K/mm3 (0.0-0.4)   07/22/18  18:24    


 


Baso #  0.0 K/mm3 (0.0-0.1)   07/22/18  18:24    


 


Add Manual Diff  Complete   07/23/18  06:38    


 


Total Counted  100   07/23/18  06:38    


 


Seg Neutrophils %  89.0 % (40.0-70.0)  H  07/22/18  18:24    


 


Seg Neuts % (Manual)  95.0 % (40.0-70.0)  H  07/23/18  06:38    


 


Band Neutrophils %  2.0 %  07/23/18  06:38    


 


Lymphocytes % (Manual)  1.0 % (13.4-35.0)  L  07/23/18  06:38    


 


Reactive Lymphs % (Man)  0 %  07/23/18  06:38    


 


Monocytes % (Manual)  1.0 % (0.0-7.3)   07/23/18  06:38    


 


Eosinophils % (Manual)  0 % (0.0-4.3)   07/23/18  06:38    


 


Basophils % (Manual)  0 % (0.0-1.8)   07/23/18  06:38    


 


Metamyelocytes %  1.0 %  07/23/18  06:38    


 


Myelocytes %  0 %  07/23/18  06:38    


 


Promyelocytes %  0 %  07/23/18  06:38    


 


Blast Cells %  0 %  07/23/18  06:38    


 


Nucleated RBC %  Not Reportable   07/23/18  06:38    


 


Seg Neutrophils #  15.5 K/mm3 (1.8-7.7)  H  07/22/18  18:24    


 


Seg Neutrophils # Man  14.9 K/mm3 (1.8-7.7)  H  07/23/18  06:38    


 


Band Neutrophils #  0.3 K/mm3  07/23/18  06:38    


 


Lymphocytes # (Manual)  0.2 K/mm3 (1.2-5.4)  L  07/23/18  06:38    


 


Abs React Lymphs (Man)  0.0 K/mm3  07/23/18  06:38    


 


Monocytes # (Manual)  0.2 K/mm3 (0.0-0.8)   07/23/18  06:38    


 


Eosinophils # (Manual)  0.0 K/mm3 (0.0-0.4)   07/23/18  06:38    


 


Basophils # (Manual)  0.0 K/mm3 (0.0-0.1)   07/23/18  06:38    


 


Metamyelocytes #  0.2 K/mm3  07/23/18  06:38    


 


Myelocytes #  0.0 K/mm3  07/23/18  06:38    


 


Promyelocytes #  0.0 K/mm3  07/23/18  06:38    


 


Blast Cells #  0.0 K/mm3  07/23/18  06:38    


 


WBC Morphology  Not Reportable   07/23/18  06:38    


 


Hypersegmented Neuts  Not Reportable   07/23/18  06:38    


 


Hyposegmented Neuts  Not Reportable   07/23/18  06:38    


 


Hypogranular Neuts  Not Reportable   07/23/18  06:38    


 


Smudge Cells  Not Reportable   07/23/18  06:38    


 


Toxic Granulation  Not Reportable   07/23/18  06:38    


 


Toxic Vacuolation  Not Reportable   07/23/18  06:38    


 


Dohle Bodies  Not Reportable   07/23/18  06:38    


 


Pelger-Huet Anomaly  Not Reportable   07/23/18  06:38    


 


Becka Rods  Not Reportable   07/23/18  06:38    


 


Platelet Estimate  Cons   07/23/18  06:38    


 


Clumped Platelets  Not Reportable   07/23/18  06:38    


 


Plt Clumps, EDTA  Not Reportable   07/23/18  06:38    


 


Large Platelets  Not Reportable   07/23/18  06:38    


 


Giant Platelets  Not Reportable   07/23/18  06:38    


 


Platelet Satelliting  Not Reportable   07/23/18  06:38    


 


Plt Morphology Comment  Not Reportable   07/23/18  06:38    


 


RBC Morphology  Not Reportable   07/23/18  06:38    


 


Dimorphic RBCs  Not Reportable   07/23/18  06:38    


 


Polychromasia  Not Reportable   07/23/18  06:38    


 


Hypochromasia  Few   07/23/18  06:38    


 


Poikilocytosis  Not Reportable   07/23/18  06:38    


 


Anisocytosis  1+   07/23/18  06:38    


 


Microcytosis  Not Reportable   07/23/18  06:38    


 


Macrocytosis  Not Reportable   07/23/18  06:38    


 


Spherocytes  Not Reportable   07/23/18  06:38    


 


Pappenheimer Bodies  Not Reportable   07/23/18  06:38    


 


Sickle Cells  Not Reportable   07/23/18  06:38    


 


Target Cells  Not Reportable   07/23/18  06:38    


 


Tear Drop Cells  Not Reportable   07/23/18  06:38    


 


Ovalocytes  Not Reportable   07/23/18  06:38    


 


Helmet Cells  Not Reportable   07/23/18  06:38    


 


[place] Bodies  Not Reportable   07/23/18  06:38    


 


Cabot Rings  Not Reportable   07/23/18  06:38    


 


[place] Cells  Not Reportable   07/23/18  06:38    


 


Bite Cells  Not Reportable   07/23/18  06:38    


 


Crenated Cell  Not Reportable   07/23/18  06:38    


 


Elliptocytes  Not Reportable   07/23/18  06:38    


 


Acanthocytes (Spur)  Not Reportable   07/23/18  06:38    


 


Rouleaux  Not Reportable   07/23/18  06:38    


 


Hemoglobin C Crystals  Not Reportable   07/23/18  06:38    


 


Schistocytes  Not Reportable   07/23/18  06:38    


 


Malaria parasites  Not Reportable   07/23/18  06:38    


 


Reji Bodies  Not Reportable   07/23/18  06:38    


 


Hem Pathologist Commnt  No   07/23/18  06:38    


 


PT  18.8 Sec. (12.2-14.9)  H  07/21/18  16:04    


 


INR  1.48  (0.87-1.13)  H  07/21/18  16:04    


 


POC ABG pH  7.470  (7.35-7.45)  H  07/21/18  16:44    


 


POC ABG pCO2  42.7  (35-45)   07/21/18  16:44    


 


POC ABG pO2  156  ()  H  07/21/18  16:44    


 


POC ABG HCO3  31.1   07/21/18  16:44    


 


POC ABG Total CO2  32   07/21/18  16:44    


 


POC ABG O2 Sat  99   07/21/18  16:44    


 


POC ABG Base Excess  7   07/21/18  16:44    


 


VBG pH  7.308  (7.320-7.420)  L  07/21/18  16:04    


 


FiO2  36 %  07/21/18  16:44    


 


Sodium  150 mmol/L (137-145)  H  07/23/18  06:38    


 


Potassium  3.0 mmol/L (3.6-5.0)  L  07/23/18  06:38    


 


Chloride  107.9 mmol/L ()  H  07/23/18  06:38    


 


Carbon Dioxide  29 mmol/L (22-30)   07/23/18  06:38    


 


Anion Gap  16 mmol/L  07/23/18  06:38    


 


BUN  24 mg/dL (7-17)  H  07/23/18  06:38    


 


Creatinine  1.0 mg/dL (0.7-1.2)   07/23/18  06:38    


 


Estimated GFR  > 60 ml/min  07/23/18  06:38    


 


BUN/Creatinine Ratio  24 %  07/23/18  06:38    


 


Glucose  58 mg/dL ()  L  07/23/18  06:38    


 


POC Glucose  226  ()  H  07/25/18  06:42    


 


Hemoglobin A1c  5.4 % (4-6)   07/21/18  23:13    


 


Lactic Acid  1.80 mmol/L (0.7-2.0)   07/22/18  18:24    


 


Calcium  7.7 mg/dL (8.4-10.2)  L  07/23/18  06:38    


 


Phosphorus  2.60 mg/dL (2.5-4.5)   07/23/18  06:38    


 


Magnesium  1.60 mg/dL (1.7-2.3)  L  07/23/18  06:38    


 


Total Bilirubin  1.30 mg/dL (0.1-1.2)  H  07/23/18  06:38    


 


AST  23 units/L (5-40)   07/23/18  06:38    


 


ALT  12 units/L (7-56)   07/23/18  06:38    


 


Alkaline Phosphatase  166 units/L ()  H  07/23/18  06:38    


 


C-Reactive Protein  25.70 mg/dL (0.00-1.30)  H  07/23/18  22:37    


 


NT-Pro-B Natriuret Pep  98322 pg/mL (0-900)  H  07/21/18  16:04    


 


Total Protein  4.8 g/dL (6.3-8.2)  L  07/23/18  06:38    


 


Albumin  1.5 g/dL (3.9-5)  L  07/23/18  06:38    


 


Albumin/Globulin Ratio  0.5 %  07/23/18  06:38    


 


TSH  1.830 mlU/mL (0.270-4.200)   07/21/18  14:25    


 


Urine Color  Yudi  (Yellow)   07/21/18  15:40    


 


Urine Turbidity  Clear  (Clear)   07/21/18  15:40    


 


Urine pH  5.0  (5.0-7.0)   07/21/18  15:40    


 


Ur Specific Gravity  1.017  (1.003-1.030)   07/21/18  15:40    


 


Urine Protein  100 mg/dl mg/dL (Negative)   07/21/18  15:40    


 


Urine Glucose (UA)  Neg mg/dL (Negative)   07/21/18  15:40    


 


Urine Ketones  Neg mg/dL (Negative)   07/21/18  15:40    


 


Urine Blood  Sm  (Negative)   07/21/18  15:40    


 


Urine Nitrite  Neg  (Negative)   07/21/18  15:40    


 


Urine Bilirubin  Neg  (Negative)   07/21/18  15:40    


 


Urine Urobilinogen  2.0 mg/dL (<2.0)   07/21/18  15:40    


 


Ur Leukocyte Esterase  Mod  (Negative)   07/21/18  15:40    


 


Urine WBC (Auto)  > 182.0 /HPF (0.0-6.0)  H  07/21/18  15:40    


 


Urine RBC (Auto)  17.0 /HPF (0.0-6.0)   07/21/18  15:40    


 


U Epithel Cells (Auto)  8.0 /HPF (0-13.0)   07/21/18  15:40    


 


Urine Bacteria (Auto)  4+ /HPF (Negative)   07/21/18  15:40    


 


Urine WBC Clumps  3+ /HPF  07/21/18  15:40    


 


Urine Mucus  Few /HPF  07/21/18  15:40    


 


Urine Opiates Screen  Presumptive negative   07/21/18  15:40    


 


Urine Methadone Screen  Presumptive positive   07/21/18  15:40    


 


Ur Barbiturates Screen  Presumptive negative   07/21/18  15:40    


 


Ur Phencyclidine Scrn  Presumptive negative   07/21/18  15:40    


 


Ur Amphetamines Screen  Presumptive negative   07/21/18  15:40    


 


U Benzodiazepines Scrn  Presumptive negative   07/21/18  15:40    


 


Urine Cocaine Screen  Presumptive negative   07/21/18  15:40    


 


U Marijuana (THC) Screen  Presumptive negative   07/21/18  15:40    


 


Drugs of Abuse Note  Disclamer   07/21/18  15:40    


 


Plasma/Serum Alcohol  < 0.01 % (0-0.07)   07/21/18  17:09

## 2018-07-25 NOTE — CONSULTATION
History of Present Illness





- Reason for Consult


Consult date: 07/25/18


right pneumothorax





- History of Present Illness





Patient with a history of multiple medical comorbidities including sacral 

decubitus, sepsis CHF an encephalopathy who presents with an incidentally 

discovered pneumothorax first noticed on CT of the abdomen and pelvis with a 

follow-up chest x-ray demonstrating approximately 15% pneumothorax on the right 

side.  Patient is breathing comfortably on 3 L of oxygen by nasal cannula.





Past History


Past Medical History: acute MI, CAD, COPD, diabetes, GERD, heart failure, 

hypertension, stroke, other


Past Surgical History: CABG


Social history: no significant social history


Family history: no significant family history





Medications and Allergies


 Allergies











Allergy/AdvReac Type Severity Reaction Status Date / Time


 


atorvastatin calcium Allergy  Unknown Verified 07/24/18 21:55





[From Lipitor]     


 


Iodinated Contrast- Oral and Allergy  Unknown Verified 07/24/18 21:55





IV Dye     





[Iodinated Contrast Media -     





IV Dye]     


 


tomato Allergy  Unknown Verified 07/24/18 21:55











 Home Medications











 Medication  Instructions  Recorded  Confirmed  Last Taken  Type


 


Gabapentin 300 mg PO BID 12/09/14 07/21/18 05/18/16 History


 


Aspirin [Aspirin BABY CHEW TAB] 81 mg PO QDAY #30 tab.chew 02/22/18 07/21/18 

Unknown Rx


 


Furosemide [Lasix TAB] 20 mg PO QDAY #30 tablet 02/22/18 07/21/18 Unknown Rx


 


Metoprolol [Lopressor TAB] 12.5 mg PO BID #30 tablet 02/22/18 07/21/18 Unknown 

Rx


 


traMADol [Ultram 50 MG tab] 50 mg PO BID #60 tablet 02/22/18 07/21/18 Unknown Rx


 


Potassium Chloride 2 tab PO DAILY 14 Days #28 04/27/18 07/21/18 Unknown Rx





 tablet.er    


 


traMADol [Ultram 50 MG tab] 50 mg PO Q6HR PRN #20 tablet 04/27/18 07/21/18 

Unknown Rx











Active Meds: 


Active Medications





Acetaminophen (Tylenol)  650 mg PO Q4H PRN


   PRN Reason: Pain MILD(1-3)/Fever >100.5/HA


Lipase/Protease/Amylase (Pancreaze Dr 10,500 Unit)  1 each FEEDTUBE PRN PRN


   PRN Reason: For Clogged Feeding Tube


Aspirin (Baby Aspirin)  81 mg PO QDAY SANDEEP


   Last Admin: 07/25/18 12:53 Dose:  Not Given


Enoxaparin Sodium (Lovenox)  30 mg SUB-Q QDAY@2200 [facility]


   Last Admin: 07/24/18 22:46 Dose:  30 mg


Famotidine (Pepcid)  20 mg PO DAILY [facility]


Furosemide (Lasix)  40 mg IV QDAY [facility]


Gabapentin (Neurontin)  300 mg PO BID [facility]


   Last Admin: 07/25/18 11:26 Dose:  300 mg


Sodium Chloride (Nacl 0.9% 1000 Ml)  1,000 mls @ 75 mls/hr IV AS DIRECT [facility]


   Last Admin: 07/25/18 04:35 Dose:  75 mls/hr


Cefazolin Sodium 2 gm/ Sodium (Chloride)  100 mls @ 200 mls/hr IV Q8HR [facility]


   Stop: 08/05/18 23:59


   Last Admin: 07/25/18 05:56 Dose:  200 mls/hr


Insulin Human Regular (Humulin R)  0 units SUB-Q Q6HR [facility]; Protocol


   Last Admin: 07/25/18 12:54 Dose:  Not Given


Losartan Potassium (Cozaar)  25 mg PO QDAY [facility]


Metoprolol Tartrate (Lopressor)  12.5 mg PO BID [facility]


   Last Admin: 07/25/18 11:26 Dose:  12.5 mg


Morphine Sulfate (Morphine)  2 mg IV Q4H PRN


   PRN Reason: Pain, Moderate (4-6)


   Last Admin: 07/24/18 07:58 Dose:  2 mg


Ondansetron HCl (Zofran)  4 mg IV Q8H PRN


   PRN Reason: Nausea And Vomiting


Potassium Chloride (K-Dur)  20 meq PO QDAY [facility]


   Last Admin: 07/25/18 11:26 Dose:  20 meq


Simple Syrup (Simple Syrup)  15 ml FEEDTUBE PRN PRN


   PRN Reason: Hypoglycemia


Simple Syrup (Simple Syrup)  30 ml FEEDTUBE PRN PRN


   PRN Reason: Hypoglycemia


Sodium Bicarbonate (Sodium Bicarbonate)  325 mg FEEDTUBE PRN PRN


   PRN Reason: For Clogged Feeding Tube


Sodium Chloride (Sodium Chloride Flush Syringe 10 Ml)  10 ml IV BID [facility]


   Last Admin: 07/25/18 04:36 Dose:  10 ml


Sodium Chloride (Sodium Chloride Flush Syringe 10 Ml)  10 ml IV PRN PRN


   PRN Reason: LINE FLUSH











Review of Systems


ROS unobtainable: due to mental status





Exam





- Constitutional


Vitals: 


 











Temp Pulse Resp BP Pulse Ox


 


 98.8 F   98 H  16   121/60   100 


 


 07/25/18 07:40  07/25/18 11:26  07/25/18 10:00  07/25/18 11:26  07/25/18 07:40











General appearance: Present: no acute distress, cachectic





- Neck


Neck: Present: supple, normal ROM





- Respiratory


Respiratory effort: normal





- Abdominal


General gastrointestinal: Present: deferred


Female genitourinary: Present: deferred





- Rectal


Rectal Exam: deferred





Results





- Labs


CBC & Chem 7: 


 07/23/18 06:38





 07/25/18 12:04


Labs: 


 Abnormal lab results











  07/24/18 07/25/18 07/25/18 Range/Units





  21:06 01:39 06:42 


 


Sodium     (137-145)  mmol/L


 


Potassium     (3.6-5.0)  mmol/L


 


Chloride     ()  mmol/L


 


BUN     (7-17)  mg/dL


 


Glucose     ()  mg/dL


 


POC Glucose  301 H  262 H  226 H  ()  


 


Calcium     (8.4-10.2)  mg/dL














  07/25/18 07/25/18 Range/Units





  11:40 12:04 


 


Sodium   157 H  (137-145)  mmol/L


 


Potassium   2.4 L*  (3.6-5.0)  mmol/L


 


Chloride   116.4 H  ()  mmol/L


 


BUN   22 H  (7-17)  mg/dL


 


Glucose   227 H  ()  mg/dL


 


POC Glucose  190 H   ()  


 


Calcium   7.6 L  (8.4-10.2)  mg/dL














- Imaging and Cardiology


Chest x-ray: image reviewed


CT scan - abdomen: image reviewed





Assessment and Plan





Patient with a small right pneumothorax.  This does not appear to be clinically 

symptomatic at this time.  We'll follow this up with chest x-rays, if the 

patient's clinical condition changes or the size increases would consider 

placing a pigtail catheter in her right pleural space.

## 2018-07-25 NOTE — PROGRESS NOTE
Assessment and Plan





Assessment: 


1) Sepsis: better; etiology - Staph bacteremia


2) MSSA bacteremia: source likely sacral decubitus vs. pneumonia


   -Blood cx 7/21 MSSA 1 of 4 bottles


   -Blood cx 7/23 no growth so far


   -TTE EF 15-20%, severe MR/TR, no vegetations 


3) Non stageable sacral ulcer 4x7.5x0.1cm and Non stageable right buttocks 

ucler 4x3x0.1: ? infected


   -CT pelvis NO osteo NO abscess


4) Bilateral pneumonia pneumonia


5) Diabetes


6) Acute encephalopathy 


7) Congestive heart failure


8) Right sided pneumothorax 25%


 


 


Plan:


-stop contact - still on contact


-continue cefazolin 


-f/u repeat blood cx 


-upon discharge will do cefazolin 2 g IV q8h total 14 days until 8/5/18


-place PICC upon discharge


-Consider hospice, poor prognosis with Staph septicemia, sacral dec and severe 

cardiomyopathy 





I am signing off 





Thank you for your consultation, will follow up with you. 





Alicia Ramirez MD


Infectious Diseases Specialist


[facility] ([facility])


M [phone]


O [phone]





Subjective


Date of service: 07/25/18


Principal diagnosis: Staph bacteremia


Interval history: 


Remains non verbal in NAD no fever





Microbiology:





Blood cultures:


7/21 MSSA 1 of 4 


7/23 ngtd





Urine cultures:





Respiratory cultures:


 





Current Antimicrobials:


cefazolin 7/24





Previous Antimicrobials:


Vancomycin


Cefepime





Objective





- Exam


Narrative Exam: 





General appearance: Alert in NAD,non  conversant 


Eyes: anicteric sclerae, moist conjunctivae; no lid-lag; PERRLA 


HENT: Atraumatic; oropharynx limited edentulous


Neck: Trachea midline; supple, no thyromegaly or lymphadenopathy 


Lungs: distant BS nunu


CV: irreg


Abdomen: Soft, non-tender 


Extremities: No peripheral edema or extremity lymphadenopathy


Skin:  


NON-STAGEABLE PRESSURE INJURY TO SACRAL AREA-ULCER MEASURES 4X7.5X0.1-NO 

DRAINAGE PRESENT-NECROTIC TISSUE PRESENT TO MAJORITY OF ULCER-HYDROCOLLOID 

APPLIED TO ULCER TO PROMOTE AUTOLYTIC DEBRIDEMENT





NON-STAGEABLE PRESSURE INJURY TO RIGHT BUTTOCKS-ULCER MEASURES 4X3X0.1-NO 

DRAINAGE PRESENT-NECROTIC TISSUE TO MAJORITY OF ULCER-HYDROCOLLOID APPLIED TO 

PROMOTE AUTOLYTIC DEBRIDEMENT 





Psych: Appropriate affect, alert and oriented to person, place and time. Neuro: 

alert and oriented x 3. Moving all extermities


Lines: No CVL / PICC











- Constitutional


Vitals: 


 Vital Signs











Temp Pulse Resp BP Pulse Ox


 


 98.8 F   102 H  20   141/70   100 


 


 07/25/18 07:40  07/25/18 07:40  07/25/18 07:40  07/25/18 07:40  07/25/18 07:40








 Temperature -Last 24 Hours











Temperature                    98.8 F


 


Temperature                    98.9 F


 


Temperature                    98.9 F


 


Temperature                    98.7 F

















- Labs


CBC & Chem 7: 


 07/23/18 06:38





 07/23/18 06:38


Labs: 


 Abnormal lab results











  07/24/18 07/24/18 07/25/18 Range/Units





  10:49 21:06 01:39 


 


POC Glucose  233 H  301 H  262 H  ()  














  07/25/18 Range/Units





  06:42 


 


POC Glucose  226 H  ()

## 2018-07-25 NOTE — CONSULTATION
History of Present Illness


Consult date: 07/25/18


Reason for consult: abnormal CXR/CT, other (pneumothorax,sarcoid)





Past History


Past Medical History: acute MI, CAD, COPD, diabetes, GERD, heart failure, 

hypertension, stroke, other


Past Surgical History: CABG


Social history: no significant social history


Family history: no significant family history





Medications and Allergies


 Allergies











Allergy/AdvReac Type Severity Reaction Status Date / Time


 


atorvastatin calcium Allergy  Unknown Verified 07/24/18 21:55





[From Lipitor]     


 


Iodinated Contrast- Oral and Allergy  Unknown Verified 07/24/18 21:55





IV Dye     





[Iodinated Contrast Media -     





IV Dye]     


 


tomato Allergy  Unknown Verified 07/24/18 21:55











 Home Medications











 Medication  Instructions  Recorded  Confirmed  Last Taken  Type


 


Gabapentin 300 mg PO BID 12/09/14 07/21/18 05/18/16 History


 


Aspirin [Aspirin BABY CHEW TAB] 81 mg PO QDAY #30 tab.chew 02/22/18 07/21/18 

Unknown Rx


 


Furosemide [Lasix TAB] 20 mg PO QDAY #30 tablet 02/22/18 07/21/18 Unknown Rx


 


Metoprolol [Lopressor TAB] 12.5 mg PO BID #30 tablet 02/22/18 07/21/18 Unknown 

Rx


 


traMADol [Ultram 50 MG tab] 50 mg PO BID #60 tablet 02/22/18 07/21/18 Unknown Rx


 


Potassium Chloride 2 tab PO DAILY 14 Days #28 04/27/18 07/21/18 Unknown Rx





 tablet.er    


 


traMADol [Ultram 50 MG tab] 50 mg PO Q6HR PRN #20 tablet 04/27/18 07/21/18 

Unknown Rx











Active Meds: 


Active Medications





Acetaminophen (Tylenol)  650 mg PO Q4H PRN


   PRN Reason: Pain MILD(1-3)/Fever >100.5/HA


Lipase/Protease/Amylase (Pancreaze Dr 10,500 Unit)  1 each FEEDTUBE PRN PRN


   PRN Reason: For Clogged Feeding Tube


Aspirin (Baby Aspirin)  81 mg PO QDAY [facility]


   Last Admin: 07/25/18 12:53 Dose:  Not Given


Enoxaparin Sodium (Lovenox)  30 mg SUB-Q QDAY@2200 [facility]


   Last Admin: 07/24/18 22:46 Dose:  30 mg


Famotidine (Pepcid)  20 mg PO DAILY [facility]


Furosemide (Lasix)  40 mg IV QDAY [facility]


Gabapentin (Neurontin)  300 mg PO BID [facility]


   Last Admin: 07/25/18 11:26 Dose:  300 mg


Sodium Chloride (Nacl 0.9% 1000 Ml)  1,000 mls @ 75 mls/hr IV AS DIRECT [facility]


   Last Admin: 07/25/18 04:35 Dose:  75 mls/hr


Cefazolin Sodium 2 gm/ Sodium (Chloride)  100 mls @ 200 mls/hr IV Q8HR [facility]


   Stop: 08/05/18 23:59


   Last Admin: 07/25/18 05:56 Dose:  200 mls/hr


Insulin Human Regular (Humulin R)  0 units SUB-Q Q6HR [facility]; Protocol


   Last Admin: 07/25/18 12:54 Dose:  Not Given


Metoprolol Tartrate (Lopressor)  12.5 mg PO BID [facility]


   Last Admin: 07/25/18 11:26 Dose:  12.5 mg


Morphine Sulfate (Morphine)  2 mg IV Q4H PRN


   PRN Reason: Pain, Moderate (4-6)


   Last Admin: 07/24/18 07:58 Dose:  2 mg


Ondansetron HCl (Zofran)  4 mg IV Q8H PRN


   PRN Reason: Nausea And Vomiting


Potassium Chloride (K-Dur)  20 meq PO QDAY [facility]


   Last Admin: 07/25/18 11:26 Dose:  20 meq


Simple Syrup (Simple Syrup)  15 ml FEEDTUBE PRN PRN


   PRN Reason: Hypoglycemia


Simple Syrup (Simple Syrup)  30 ml FEEDTUBE PRN PRN


   PRN Reason: Hypoglycemia


Sodium Bicarbonate (Sodium Bicarbonate)  325 mg FEEDTUBE PRN PRN


   PRN Reason: For Clogged Feeding Tube


Sodium Chloride (Sodium Chloride Flush Syringe 10 Ml)  10 ml IV BID [facility]


   Last Admin: 07/25/18 04:36 Dose:  10 ml


Sodium Chloride (Sodium Chloride Flush Syringe 10 Ml)  10 ml IV PRN PRN


   PRN Reason: LINE FLUSH











Physical Examination


Vital signs: 


 Vital Signs











Pulse Resp


 


 115 H  19 


 


 07/21/18 13:59  07/21/18 13:59














Results





- Laboratory Findings


CBC and BMP: 


 07/23/18 06:38





 07/23/18 06:38


ABG











POC ABG pH  7.470  (7.35-7.45)  H  07/21/18  16:44    


 


POC ABG pCO2  42.7  (35-45)   07/21/18  16:44    


 


POC ABG pO2  156  ()  H  07/21/18  16:44    


 


POC ABG HCO3  31.1   07/21/18  16:44    


 


POC ABG Total CO2  32   07/21/18  16:44    


 


POC ABG O2 Sat  99   07/21/18  16:44    





PT/INR, D-dimer











PT  18.8 Sec. (12.2-14.9)  H  07/21/18  16:04    


 


INR  1.48  (0.87-1.13)  H  07/21/18  16:04    








Abnormal lab findings: 


 Abnormal Labs











  07/21/18 07/21/18 07/21/18





  15:40 16:04 16:04


 


WBC   24.9 H 


 


Hgb   


 


MCH   26 L 


 


RDW   16.6 H 


 


Plt Count   


 


Lymph % (Auto)   


 


Seg Neutrophils %   


 


Seg Neuts % (Manual)   97.0 H 


 


Lymphocytes % (Manual)   1.5 L 


 


Seg Neutrophils #   


 


Seg Neutrophils # Man   24.2 H 


 


Lymphocytes # (Manual)   0.4 L 


 


PT    18.8 H


 


INR    1.48 H


 


POC ABG pH   


 


POC ABG pO2   


 


VBG pH   


 


Sodium   


 


Potassium   


 


Chloride   


 


BUN   


 


Creatinine   


 


Glucose   


 


POC Glucose   


 


Lactic Acid   


 


Calcium   


 


Magnesium   


 


Total Bilirubin   


 


AST   


 


Alkaline Phosphatase   


 


C-Reactive Protein   


 


NT-Pro-B Natriuret Pep   


 


Total Protein   


 


Albumin   


 


Urine WBC (Auto)  > 182.0 H  














  07/21/18 07/21/18 07/21/18





  16:04 16:04 16:04


 


WBC   


 


Hgb   


 


MCH   


 


RDW   


 


Plt Count   


 


Lymph % (Auto)   


 


Seg Neutrophils %   


 


Seg Neuts % (Manual)   


 


Lymphocytes % (Manual)   


 


Seg Neutrophils #   


 


Seg Neutrophils # Man   


 


Lymphocytes # (Manual)   


 


PT   


 


INR   


 


POC ABG pH   


 


POC ABG pO2   


 


VBG pH    7.308 L


 


Sodium   


 


Potassium   


 


Chloride  95.6 L  


 


BUN  24 H  


 


Creatinine  1.6 H  


 


Glucose  162 H  


 


POC Glucose   


 


Lactic Acid   8.80 H* 


 


Calcium   


 


Magnesium   


 


Total Bilirubin  1.90 H  


 


AST  68 H  


 


Alkaline Phosphatase  253 H  


 


C-Reactive Protein   


 


NT-Pro-B Natriuret Pep   


 


Total Protein  5.8 L  


 


Albumin  1.9 L  


 


Urine WBC (Auto)   














  07/21/18 07/21/18 07/21/18





  16:04 16:44 23:03


 


WBC   


 


Hgb   


 


MCH   


 


RDW   


 


Plt Count   


 


Lymph % (Auto)   


 


Seg Neutrophils %   


 


Seg Neuts % (Manual)   


 


Lymphocytes % (Manual)   


 


Seg Neutrophils #   


 


Seg Neutrophils # Man   


 


Lymphocytes # (Manual)   


 


PT   


 


INR   


 


POC ABG pH   7.470 H 


 


POC ABG pO2   156 H 


 


VBG pH   


 


Sodium   


 


Potassium   


 


Chloride   


 


BUN   


 


Creatinine   


 


Glucose   


 


POC Glucose    158 H


 


Lactic Acid   


 


Calcium   


 


Magnesium   


 


Total Bilirubin   


 


AST   


 


Alkaline Phosphatase   


 


C-Reactive Protein   


 


NT-Pro-B Natriuret Pep  27268 H  


 


Total Protein   


 


Albumin   


 


Urine WBC (Auto)   














  07/21/18 07/22/18 07/22/18





  23:08 12:36 15:38


 


WBC   


 


Hgb   


 


MCH   


 


RDW   


 


Plt Count   


 


Lymph % (Auto)   


 


Seg Neutrophils %   


 


Seg Neuts % (Manual)   


 


Lymphocytes % (Manual)   


 


Seg Neutrophils #   


 


Seg Neutrophils # Man   


 


Lymphocytes # (Manual)   


 


PT   


 


INR   


 


POC ABG pH   


 


POC ABG pO2   


 


VBG pH   


 


Sodium   


 


Potassium   


 


Chloride   


 


BUN   


 


Creatinine   


 


Glucose   


 


POC Glucose   109 H  110 H


 


Lactic Acid  3.00 H*  


 


Calcium   


 


Magnesium   


 


Total Bilirubin   


 


AST   


 


Alkaline Phosphatase   


 


C-Reactive Protein   


 


NT-Pro-B Natriuret Pep   


 


Total Protein   


 


Albumin   


 


Urine WBC (Auto)   














  07/22/18 07/22/18 07/23/18





  18:24 18:24 06:38


 


WBC  17.4 H   15.7 H


 


Hgb    9.9 L


 


MCH  26 L   26 L


 


RDW  16.5 H   16.6 H


 


Plt Count    135 L


 


Lymph % (Auto)  7.8 L  


 


Seg Neutrophils %  89.0 H  


 


Seg Neuts % (Manual)    95.0 H


 


Lymphocytes % (Manual)    1.0 L


 


Seg Neutrophils #  15.5 H  


 


Seg Neutrophils # Man    14.9 H


 


Lymphocytes # (Manual)    0.2 L


 


PT   


 


INR   


 


POC ABG pH   


 


POC ABG pO2   


 


VBG pH   


 


Sodium   


 


Potassium   3.5 L 


 


Chloride   


 


BUN   26 H 


 


Creatinine   


 


Glucose   


 


POC Glucose   


 


Lactic Acid   


 


Calcium   8.1 L 


 


Magnesium   


 


Total Bilirubin   1.40 H 


 


AST   


 


Alkaline Phosphatase   203 H 


 


C-Reactive Protein   


 


NT-Pro-B Natriuret Pep   


 


Total Protein   5.4 L 


 


Albumin   1.6 L 


 


Urine WBC (Auto)   














  07/23/18 07/23/18 07/23/18





  06:38 10:08 22:37


 


WBC   


 


Hgb   


 


MCH   


 


RDW   


 


Plt Count   


 


Lymph % (Auto)   


 


Seg Neutrophils %   


 


Seg Neuts % (Manual)   


 


Lymphocytes % (Manual)   


 


Seg Neutrophils #   


 


Seg Neutrophils # Man   


 


Lymphocytes # (Manual)   


 


PT   


 


INR   


 


POC ABG pH   


 


POC ABG pO2   


 


VBG pH   


 


Sodium  150 H  


 


Potassium  3.0 L  


 


Chloride  107.9 H  


 


BUN  24 H  


 


Creatinine   


 


Glucose  58 L  


 


POC Glucose   69 L 


 


Lactic Acid   


 


Calcium  7.7 L  


 


Magnesium  1.60 L  


 


Total Bilirubin  1.30 H  


 


AST   


 


Alkaline Phosphatase  166 H  


 


C-Reactive Protein    25.70 H


 


NT-Pro-B Natriuret Pep   


 


Total Protein  4.8 L  


 


Albumin  1.5 L  


 


Urine WBC (Auto)   














  07/24/18 07/24/18 07/25/18





  10:49 21:06 01:39


 


WBC   


 


Hgb   


 


MCH   


 


RDW   


 


Plt Count   


 


Lymph % (Auto)   


 


Seg Neutrophils %   


 


Seg Neuts % (Manual)   


 


Lymphocytes % (Manual)   


 


Seg Neutrophils #   


 


Seg Neutrophils # Man   


 


Lymphocytes # (Manual)   


 


PT   


 


INR   


 


POC ABG pH   


 


POC ABG pO2   


 


VBG pH   


 


Sodium   


 


Potassium   


 


Chloride   


 


BUN   


 


Creatinine   


 


Glucose   


 


POC Glucose  233 H  301 H  262 H


 


Lactic Acid   


 


Calcium   


 


Magnesium   


 


Total Bilirubin   


 


AST   


 


Alkaline Phosphatase   


 


C-Reactive Protein   


 


NT-Pro-B Natriuret Pep   


 


Total Protein   


 


Albumin   


 


Urine WBC (Auto)   














  07/25/18 07/25/18





  06:42 11:40


 


WBC  


 


Hgb  


 


MCH  


 


RDW  


 


Plt Count  


 


Lymph % (Auto)  


 


Seg Neutrophils %  


 


Seg Neuts % (Manual)  


 


Lymphocytes % (Manual)  


 


Seg Neutrophils #  


 


Seg Neutrophils # Man  


 


Lymphocytes # (Manual)  


 


PT  


 


INR  


 


POC ABG pH  


 


POC ABG pO2  


 


VBG pH  


 


Sodium  


 


Potassium  


 


Chloride  


 


BUN  


 


Creatinine  


 


Glucose  


 


POC Glucose  226 H  190 H


 


Lactic Acid  


 


Calcium  


 


Magnesium  


 


Total Bilirubin  


 


AST  


 


Alkaline Phosphatase  


 


C-Reactive Protein  


 


NT-Pro-B Natriuret Pep  


 


Total Protein  


 


Albumin  


 


Urine WBC (Auto)

## 2018-07-26 LAB
BASOPHILS # (AUTO): 0 K/MM3 (ref 0–0.1)
BASOPHILS NFR BLD AUTO: 0.1 % (ref 0–1.8)
BUN SERPL-MCNC: 25 MG/DL (ref 7–17)
BUN/CREAT SERPL: 25 %
CALCIUM SERPL-MCNC: 7.4 MG/DL (ref 8.4–10.2)
CREATININE,URINE: 30.5 MG/DL (ref 0.1–20)
EOSINOPHIL # BLD AUTO: 0 K/MM3 (ref 0–0.4)
EOSINOPHIL NFR BLD AUTO: 0.2 % (ref 0–4.3)
HCT VFR BLD CALC: 28.8 % (ref 30.3–42.9)
HEMOLYSIS INDEX: 10
HGB BLD-MCNC: 9 GM/DL (ref 10.1–14.3)
LYMPHOCYTES # BLD AUTO: 0.7 K/MM3 (ref 1.2–5.4)
LYMPHOCYTES NFR BLD AUTO: 7.6 % (ref 13.4–35)
MCH RBC QN AUTO: 27 PG (ref 28–32)
MCHC RBC AUTO-ENTMCNC: 31 % (ref 30–34)
MCV RBC AUTO: 84 FL (ref 79–97)
MONOCYTES # (AUTO): 0.2 K/MM3 (ref 0–0.8)
MONOCYTES % (AUTO): 2.1 % (ref 0–7.3)
PLATELET # BLD: 67 K/MM3 (ref 140–440)
RBC # BLD AUTO: 3.41 M/MM3 (ref 3.65–5.03)

## 2018-07-26 PROCEDURE — 02HV33Z INSERTION OF INFUSION DEVICE INTO SUPERIOR VENA CAVA, PERCUTANEOUS APPROACH: ICD-10-PCS | Performed by: INTERNAL MEDICINE

## 2018-07-26 RX ADMIN — GABAPENTIN SCH MG: 300 CAPSULE ORAL at 13:16

## 2018-07-26 RX ADMIN — ASPIRIN SCH MG: 81 TABLET, CHEWABLE ORAL at 13:20

## 2018-07-26 RX ADMIN — CEFAZOLIN SCH MLS/HR: 10 INJECTION, POWDER, FOR SOLUTION INTRAVENOUS at 16:07

## 2018-07-26 RX ADMIN — POTASSIUM CHLORIDE SCH MEQ: 1.5 POWDER, FOR SOLUTION ORAL at 16:07

## 2018-07-26 RX ADMIN — CLOPIDOGREL BISULFATE SCH MG: 75 TABLET ORAL at 13:16

## 2018-07-26 RX ADMIN — FAMOTIDINE SCH MG: 20 TABLET ORAL at 13:16

## 2018-07-26 RX ADMIN — POTASSIUM CHLORIDE SCH MEQ: 1.5 POWDER, FOR SOLUTION ORAL at 22:00

## 2018-07-26 RX ADMIN — ALBUTEROL SULFATE SCH MG: 2.5 SOLUTION RESPIRATORY (INHALATION) at 11:53

## 2018-07-26 RX ADMIN — POTASSIUM CHLORIDE SCH: 1500 TABLET, EXTENDED RELEASE ORAL at 15:40

## 2018-07-26 RX ADMIN — GABAPENTIN SCH MG: 300 CAPSULE ORAL at 22:00

## 2018-07-26 RX ADMIN — FUROSEMIDE SCH MG: 10 INJECTION, SOLUTION INTRAVENOUS at 03:46

## 2018-07-26 RX ADMIN — LOSARTAN POTASSIUM SCH: 25 TABLET, FILM COATED ORAL at 12:39

## 2018-07-26 RX ADMIN — CEFAZOLIN SCH MLS/HR: 10 INJECTION, POWDER, FOR SOLUTION INTRAVENOUS at 05:58

## 2018-07-26 RX ADMIN — ENOXAPARIN SODIUM SCH MG: 100 INJECTION SUBCUTANEOUS at 22:00

## 2018-07-26 RX ADMIN — MORPHINE SULFATE PRN MG: 2 INJECTION, SOLUTION INTRAMUSCULAR; INTRAVENOUS at 18:59

## 2018-07-26 RX ADMIN — ALBUTEROL SULFATE SCH MG: 2.5 SOLUTION RESPIRATORY (INHALATION) at 15:30

## 2018-07-26 RX ADMIN — METOPROLOL TARTRATE SCH: 25 TABLET, FILM COATED ORAL at 12:40

## 2018-07-26 RX ADMIN — Medication SCH ML: at 13:32

## 2018-07-26 NOTE — CONSULTATION
History of Present Illness





- Reason for Consult


Consult date: 07/26/18


hypernatremia





- History of Present Illness


patient is a 68 year old with multiple comorbidities was admitted for worsening 

mental status and failure to thrive. she was started on tube feeding and 

planning on possible PEG. discussed with nurse, she was not getting any free 

water flushes with TF. renal consult was requested for worsening hyponatremia








Past History


Past Medical History: acute MI, CAD, COPD, diabetes, GERD, heart failure, 

hypertension, stroke, other


Past Surgical History: CABG


Social history: no significant social history


Family history: no significant family history





Medications and Allergies


 Allergies











Allergy/AdvReac Type Severity Reaction Status Date / Time


 


atorvastatin calcium Allergy  Unknown Verified 07/24/18 21:55





[From Lipitor]     


 


Iodinated Contrast- Oral and Allergy  Unknown Verified 07/24/18 21:55





IV Dye     





[Iodinated Contrast Media -     





IV Dye]     


 


tomato Allergy  Unknown Verified 07/24/18 21:55


 


lisinopril AdvReac  Swelling Verified 07/26/18 08:02











 Home Medications











 Medication  Instructions  Recorded  Confirmed  Last Taken  Type


 


Gabapentin 300 mg PO BID 12/09/14 07/21/18 05/18/16 History


 


Aspirin [Aspirin BABY CHEW TAB] 81 mg PO QDAY #30 tab.chew 02/22/18 07/21/18 

Unknown Rx


 


Furosemide [Lasix TAB] 20 mg PO QDAY #30 tablet 02/22/18 07/21/18 Unknown Rx


 


Metoprolol [Lopressor TAB] 12.5 mg PO BID #30 tablet 02/22/18 07/21/18 Unknown 

Rx


 


traMADol [Ultram 50 MG tab] 50 mg PO BID #60 tablet 02/22/18 07/21/18 Unknown Rx


 


Potassium Chloride 2 tab PO DAILY 14 Days #28 04/27/18 07/21/18 Unknown Rx





 tablet.er    


 


traMADol [Ultram 50 MG tab] 50 mg PO Q6HR PRN #20 tablet 04/27/18 07/21/18 

Unknown Rx











Active Meds: 


Active Medications





Acetaminophen (Tylenol)  650 mg PO Q4H PRN


   PRN Reason: Pain MILD(1-3)/Fever >100.5/HA


Albuterol (Proventil)  2.5 mg IH QIDRT SANDEEP


Lipase/Protease/Amylase (Pancreaze Dr 10,500 Unit)  1 each FEEDTUBE PRN PRN


   PRN Reason: For Clogged Feeding Tube


Lipase/Protease/Amylase (Pancreaze Dr 10,500 Unit)  1 each FEEDTUBE PRN PRN


   PRN Reason: For Clogged Feeding Tube


Aspirin (Baby Aspirin)  81 mg PO QDAY [facility]


   Last Admin: 07/25/18 12:53 Dose:  Not Given


Clopidogrel Bisulfate (Plavix)  75 mg PO QDAY [facility]


Enoxaparin Sodium (Lovenox)  30 mg SUB-Q QDAY@2200 [facility]


   Last Admin: 07/25/18 22:32 Dose:  30 mg


Famotidine (Pepcid)  20 mg PO DAILY [facility]


Furosemide (Lasix)  40 mg IV QDAY [facility]


   Last Admin: 07/26/18 03:46 Dose:  40 mg


Gabapentin (Neurontin)  300 mg PO BID [facility]


   Last Admin: 07/25/18 22:32 Dose:  300 mg


Cefazolin Sodium 2 gm/ Sodium (Chloride)  100 mls @ 200 mls/hr IV Q8HR [facility]


   Stop: 08/05/18 23:59


   Last Admin: 07/26/18 05:58 Dose:  200 mls/hr


Insulin Human Regular (Humulin R)  0 units SUB-Q Q6HR [facility]; Protocol


   Last Admin: 07/26/18 07:28 Dose:  3 units


Losartan Potassium (Cozaar)  12.5 mg PO QDAY [facility]


Metoprolol Tartrate (Lopressor)  12.5 mg PO BID [facility]


   Last Admin: 07/25/18 22:32 Dose:  Not Given


Morphine Sulfate (Morphine)  2 mg IV Q4H PRN


   PRN Reason: Pain, Moderate (4-6)


   Last Admin: 07/24/18 07:58 Dose:  2 mg


Ondansetron HCl (Zofran)  4 mg IV Q8H PRN


   PRN Reason: Nausea And Vomiting


Potassium Chloride (K-Dur)  20 meq PO QDAY [facility]


   Last Admin: 07/25/18 11:26 Dose:  20 meq


Potassium Chloride (Potassium Chloride)  20 meq FEEDTUBE BID [facility]


Pravastatin Sodium (Pravachol)  80 mg PO QHS [facility]


   Last Admin: 07/25/18 22:32 Dose:  80 mg


Prednisone (Prednisone)  5 mg FEEDTUBE QDAY [facility]


Simple Syrup (Simple Syrup)  15 ml FEEDTUBE PRN PRN


   PRN Reason: Hypoglycemia


Simple Syrup (Simple Syrup)  30 ml FEEDTUBE PRN PRN


   PRN Reason: Hypoglycemia


Simple Syrup (Simple Syrup)  15 ml FEEDTUBE PRN PRN


   PRN Reason: Hypoglycemia


Simple Syrup (Simple Syrup)  30 ml FEEDTUBE PRN PRN


   PRN Reason: Hypoglycemia


Sodium Bicarbonate (Sodium Bicarbonate)  325 mg FEEDTUBE PRN PRN


   PRN Reason: For Clogged Feeding Tube


Sodium Bicarbonate (Sodium Bicarbonate)  325 mg FEEDTUBE PRN PRN


   PRN Reason: For Clogged Feeding Tube


Sodium Chloride (Sodium Chloride Flush Syringe 10 Ml)  10 ml IV BID [facility]


   Last Admin: 07/25/18 22:40 Dose:  10 ml


Sodium Chloride (Sodium Chloride Flush Syringe 10 Ml)  10 ml IV PRN PRN


   PRN Reason: LINE FLUSH











Review of Systems


ROS unobtainable: due to mental status





Exam





- Vital Signs


Vital signs: 


 Vital Signs











Pulse Resp


 


 115 H  19 


 


 07/21/18 13:59  07/21/18 13:59














- General Appearance


General appearance: appears stated age, cachectic


EENT: ATNC, mucous membranes dry


Neck: Present: neck supple


Respiratory: Decreased Breath Sounds


Heart: regular, S1S2


Gastrointestinal: Present: normoactive bowel sounds


Integumentary: no rash, warm and dry


Neurologic: other (does not follow commands)


Musculoskeletal: Present: other (trace pitting edema in BLE)


Psychiatric: other (does not answer questions)





Results





- Lab Results





 07/26/18 Unknown





 07/26/18 Unknown


 Most recent lab results











Calcium  7.4 mg/dL (8.4-10.2)  L  07/26/18  Unknown


 


Phosphorus  2.60 mg/dL (2.5-4.5)   07/23/18  06:38    


 


Magnesium  2.10 mg/dL (1.7-2.3)   07/26/18  Unknown














Assessment and Plan


Hypernatremia


- secondary to poor water intake


- discussed with RN, will start free water flushes 250 cc Q4H


- Na checks Q6H


- will urine osm and Na


- strict I&O


- if no improvement will start D5W drip





Hypokalemia


- will start Kcl 20 meq BID via NGT








MSSA bacteremia


UTI


- on cefazolin





Acute hypoxemia respiratory failure


Acute on chronic systolic congestive heart failure; EF 15-20%


Bilateral pneumonia. 


- pulm consult is pending


- IR following for pneumothorax, no chest tube for now, improving


- followed by cardiology, on lasix 40 mg IV Qday





Stage IV decubitus ulcer


Surgery following, possible surgical debridement if needed





Metabolic encephalopathy


- possibly secondary to sepsis








Severe malnutrition/hypoalbuminemia; 


- TF via NGT


-Patient may need PEG placement at some point





thank you for allowing to participate in Ms Buck's care. please call me if you 

have any question, cell phone [phone]

## 2018-07-26 NOTE — PROGRESS NOTE
Assessment and Plan


Assessment and plan: 





--Acute hypoxemia respiratory failure.  Etiology is multifactorial secondary to 

pneumothorax, systolic CHF, sarcoidosis and pneumonia.  Patient appears to be 

compensated with O2 via NC.  [facility] consultation pending.





--Bilateral pneumonia.  Continue antibiotics per ID.





--Right pneumothorax (25%)


IR plans to have follow-up with serial chest x-ray and continue conservative 

treatment.  If the patient's clinical condition changes or the size increases, 

would consider placing a pigtail catheter in her right pleural space.  

Pulmonary consultation pending





--Staph aureus bacteremia; 1:2 blood cultures,ID following


Cefazolin per ID.  Continue 2 g IV every 8 hours for total of 14 days until 8/5/ 18.





--Stage IV decubitus ulcer; wound care,IV antibiotics


Surgery following, possible surgical debridement if needed





--Sepsis;due to UTI


Continue Vanco and Zosyn, follow cultures





--Lactic acidosis; secondary to sepsis


Continue current management





--Metabolic encephalopathy; probably due to sepsis


Patient is noncommunicative





--Acute on chronic systolic congestive heart failure; EF 15-20% (7/22/18)


Continue anti-failure medications, cardiology consultation pending





--Ischemic Cardiomyopathy.


H/o recent STEMI on 1/3/2018 - [facility] showed triple vessel CAD and severe lv 

systolic dysfunction with EF 25%, pt declined re-do CABG at [place]





--Severe MR/TR





--Hypokalemia


Replete potassium as needed





--Sarcoidosis


Resume home prednisone 5 mg daily.





--Rheumatoid arthritis





--History of coronary artery disease status post CABG; 


continue current cardiac medications, cardiology consultation pending





--Severe malnutrition/hypoalbuminemia; 


nutrition supplement and supportive care, Nutrition consult 


poor oral intake, discussed with her daughter, agreed for Dobbhoff feeding 


Patient may need PEG placement at some point





--Code status discussed with the daughter at the bedside, will check with the 

family and inform us





--Full code at this point





Patient is critically ill with poor prognosis


Plan of care reviewed with the patient's daughter at the bedside and her nurse





Disposition; follow consultations and recommendations


Poor prognosis.  I discuss possibility of hospice with the family.








History


Interval history: 





68-year-old female patient with significant history of congestive heart failure 

and diabetes mellitus 


sacral decubitus ulcers was admitted through emergency room with altered level 

of consciousness 


and failure to thrive with poor oral intake.  Patient is being symptomatically 

managed with empiric antibiotics, 


surgery evaluated the patient, possible surgical debridement if needed, blood 

cultures 1;2 positive for staph aureus


Patient also noted incidental finding on CT scan of 25% right pneumothorax.





Hospitalist Physical





- Constitutional


Vitals: 


 











Temp Pulse Resp BP Pulse Ox


 


 101 F H  95 H  18   102/45   91 


 


 07/26/18 08:00  07/26/18 07:42  07/26/18 07:41  07/26/18 08:00  07/26/18 07:42











General appearance: Present: mild distress, cachectic





- EENT


Eyes: Present: PERRL, EOM intact


ENT: hearing intact, clear oral mucosa, dentition normal





- Neck


Neck: Present: supple, normal ROM





- Respiratory


Respiratory effort: normal


Respiratory: bilateral: CTA





- Cardiovascular


Rhythm: regular


Heart Sounds: Present: S1 & S2.  Absent: gallop, rub





- Extremities


Extremities: no ischemia, No edema, Full ROM





- Abdominal


General gastrointestinal: soft, non-tender, non-distended, normal bowel sounds





- Integumentary


Integumentary: Present: clear, warm, dry





- Neurologic


Neurologic: CNII-XII intact, moves all extremities





Results





- Labs


CBC & Chem 7: 


 07/26/18 Unknown





 07/26/18 Unknown


Labs: 


 Laboratory Last Values











WBC  9.7 K/mm3 (4.5-11.0)   07/26/18  Unknown


 


RBC  3.41 M/mm3 (3.65-5.03)  L  07/26/18  Unknown


 


Hgb  9.0 gm/dl (10.1-14.3)  L  07/26/18  Unknown


 


Hct  28.8 % (30.3-42.9)  L  07/26/18  Unknown


 


MCV  84 fl (79-97)   07/26/18  Unknown


 


MCH  27 pg (28-32)  L  07/26/18  Unknown


 


MCHC  31 % (30-34)   07/26/18  Unknown


 


RDW  16.6 % (13.2-15.2)  H  07/26/18  Unknown


 


Plt Count  67 K/mm3 (140-440)  L  07/26/18  Unknown


 


Lymph % (Auto)  7.6 % (13.4-35.0)  L  07/26/18  Unknown


 


Mono % (Auto)  2.1 % (0.0-7.3)   07/26/18  Unknown


 


Eos % (Auto)  0.2 % (0.0-4.3)   07/26/18  Unknown


 


Baso % (Auto)  0.1 % (0.0-1.8)   07/26/18  Unknown


 


Lymph #  0.7 K/mm3 (1.2-5.4)  L  07/26/18  Unknown


 


Mono #  0.2 K/mm3 (0.0-0.8)   07/26/18  Unknown


 


Eos #  0.0 K/mm3 (0.0-0.4)   07/26/18  Unknown


 


Baso #  0.0 K/mm3 (0.0-0.1)   07/26/18  Unknown


 


Add Manual Diff  Complete   07/23/18  06:38    


 


Total Counted  100   07/23/18  06:38    


 


Seg Neutrophils %  90.0 % (40.0-70.0)  H  07/26/18  Unknown


 


Seg Neuts % (Manual)  95.0 % (40.0-70.0)  H  07/23/18  06:38    


 


Band Neutrophils %  2.0 %  07/23/18  06:38    


 


Lymphocytes % (Manual)  1.0 % (13.4-35.0)  L  07/23/18  06:38    


 


Reactive Lymphs % (Man)  0 %  07/23/18  06:38    


 


Monocytes % (Manual)  1.0 % (0.0-7.3)   07/23/18  06:38    


 


Eosinophils % (Manual)  0 % (0.0-4.3)   07/23/18  06:38    


 


Basophils % (Manual)  0 % (0.0-1.8)   07/23/18  06:38    


 


Metamyelocytes %  1.0 %  07/23/18  06:38    


 


Myelocytes %  0 %  07/23/18  06:38    


 


Promyelocytes %  0 %  07/23/18  06:38    


 


Blast Cells %  0 %  07/23/18  06:38    


 


Nucleated RBC %  Not Reportable   07/23/18  06:38    


 


Seg Neutrophils #  8.8 K/mm3 (1.8-7.7)  H  07/26/18  Unknown


 


Seg Neutrophils # Man  14.9 K/mm3 (1.8-7.7)  H  07/23/18  06:38    


 


Band Neutrophils #  0.3 K/mm3  07/23/18  06:38    


 


Lymphocytes # (Manual)  0.2 K/mm3 (1.2-5.4)  L  07/23/18  06:38    


 


Abs React Lymphs (Man)  0.0 K/mm3  07/23/18  06:38    


 


Monocytes # (Manual)  0.2 K/mm3 (0.0-0.8)   07/23/18  06:38    


 


Eosinophils # (Manual)  0.0 K/mm3 (0.0-0.4)   07/23/18  06:38    


 


Basophils # (Manual)  0.0 K/mm3 (0.0-0.1)   07/23/18  06:38    


 


Metamyelocytes #  0.2 K/mm3  07/23/18  06:38    


 


Myelocytes #  0.0 K/mm3  07/23/18  06:38    


 


Promyelocytes #  0.0 K/mm3  07/23/18  06:38    


 


Blast Cells #  0.0 K/mm3  07/23/18  06:38    


 


WBC Morphology  Not Reportable   07/23/18  06:38    


 


Hypersegmented Neuts  Not Reportable   07/23/18  06:38    


 


Hyposegmented Neuts  Not Reportable   07/23/18  06:38    


 


Hypogranular Neuts  Not Reportable   07/23/18  06:38    


 


Smudge Cells  Not Reportable   07/23/18  06:38    


 


Toxic Granulation  Not Reportable   07/23/18  06:38    


 


Toxic Vacuolation  Not Reportable   07/23/18  06:38    


 


Dohle Bodies  Not Reportable   07/23/18  06:38    


 


Pelger-Huet Anomaly  Not Reportable   07/23/18  06:38    


 


Becka Rods  Not Reportable   07/23/18  06:38    


 


Platelet Estimate  Cons   07/23/18  06:38    


 


Clumped Platelets  Not Reportable   07/23/18  06:38    


 


Plt Clumps, EDTA  Not Reportable   07/23/18  06:38    


 


Large Platelets  Not Reportable   07/23/18  06:38    


 


Giant Platelets  Not Reportable   07/23/18  06:38    


 


Platelet Satelliting  Not Reportable   07/23/18  06:38    


 


Plt Morphology Comment  Not Reportable   07/23/18  06:38    


 


RBC Morphology  Not Reportable   07/23/18  06:38    


 


Dimorphic RBCs  Not Reportable   07/23/18  06:38    


 


Polychromasia  Not Reportable   07/23/18  06:38    


 


Hypochromasia  Few   07/23/18  06:38    


 


Poikilocytosis  Not Reportable   07/23/18  06:38    


 


Anisocytosis  1+   07/23/18  06:38    


 


Microcytosis  Not Reportable   07/23/18  06:38    


 


Macrocytosis  Not Reportable   07/23/18  06:38    


 


Spherocytes  Not Reportable   07/23/18  06:38    


 


Pappenheimer Bodies  Not Reportable   07/23/18  06:38    


 


Sickle Cells  Not Reportable   07/23/18  06:38    


 


Target Cells  Not Reportable   07/23/18  06:38    


 


Tear Drop Cells  Not Reportable   07/23/18  06:38    


 


Ovalocytes  Not Reportable   07/23/18  06:38    


 


Helmet Cells  Not Reportable   07/23/18  06:38    


 


[place] Bodies  Not Reportable   07/23/18  06:38    


 


Cabot Rings  Not Reportable   07/23/18  06:38    


 


Lalo Cells  Not Reportable   07/23/18  06:38    


 


Bite Cells  Not Reportable   07/23/18  06:38    


 


Crenated Cell  Not Reportable   07/23/18  06:38    


 


Elliptocytes  Not Reportable   07/23/18  06:38    


 


Acanthocytes (Spur)  Not Reportable   07/23/18  06:38    


 


Rouleaux  Not Reportable   07/23/18  06:38    


 


Hemoglobin C Crystals  Not Reportable   07/23/18  06:38    


 


Schistocytes  Not Reportable   07/23/18  06:38    


 


Malaria parasites  Not Reportable   07/23/18  06:38    


 


Reji Bodies  Not Reportable   07/23/18  06:38    


 


Hem Pathologist Commnt  No   07/23/18  06:38    


 


PT  18.8 Sec. (12.2-14.9)  H  07/21/18  16:04    


 


INR  1.48  (0.87-1.13)  H  07/21/18  16:04    


 


POC ABG pH  7.470  (7.35-7.45)  H  07/21/18  16:44    


 


POC ABG pCO2  42.7  (35-45)   07/21/18  16:44    


 


POC ABG pO2  156  ()  H  07/21/18  16:44    


 


POC ABG HCO3  31.1   07/21/18  16:44    


 


POC ABG Total CO2  32   07/21/18  16:44    


 


POC ABG O2 Sat  99   07/21/18  16:44    


 


POC ABG Base Excess  7   07/21/18  16:44    


 


VBG pH  7.308  (7.320-7.420)  L  07/21/18  16:04    


 


FiO2  36 %  07/21/18  16:44    


 


Sodium  157 mmol/L (137-145)  H  07/26/18  Unknown


 


Potassium  3.5 mmol/L (3.6-5.0)  L  07/26/18  Unknown


 


Chloride  120.1 mmol/L ()  H  07/26/18  Unknown


 


Carbon Dioxide  25 mmol/L (22-30)   07/26/18  Unknown


 


Anion Gap  15 mmol/L  07/26/18  Unknown


 


BUN  25 mg/dL (7-17)  H  07/26/18  Unknown


 


Creatinine  1.0 mg/dL (0.7-1.2)   07/26/18  Unknown


 


Estimated GFR  > 60 ml/min  07/26/18  Unknown


 


BUN/Creatinine Ratio  25 %  07/26/18  Unknown


 


Glucose  268 mg/dL ()  H  07/26/18  Unknown


 


POC Glucose  280  ()  H  07/26/18  06:36    


 


Hemoglobin A1c  5.4 % (4-6)   07/21/18  23:13    


 


Lactic Acid  1.80 mmol/L (0.7-2.0)   07/22/18  18:24    


 


Calcium  7.4 mg/dL (8.4-10.2)  L  07/26/18  Unknown


 


Phosphorus  2.60 mg/dL (2.5-4.5)   07/23/18  06:38    


 


Magnesium  2.10 mg/dL (1.7-2.3)   07/26/18  Unknown


 


Total Bilirubin  1.30 mg/dL (0.1-1.2)  H  07/23/18  06:38    


 


AST  23 units/L (5-40)   07/23/18  06:38    


 


ALT  12 units/L (7-56)   07/23/18  06:38    


 


Alkaline Phosphatase  166 units/L ()  H  07/23/18  06:38    


 


C-Reactive Protein  25.70 mg/dL (0.00-1.30)  H  07/23/18  22:37    


 


NT-Pro-B Natriuret Pep  26141 pg/mL (0-900)  H  07/21/18  16:04    


 


Total Protein  4.8 g/dL (6.3-8.2)  L  07/23/18  06:38    


 


Albumin  1.5 g/dL (3.9-5)  L  07/23/18  06:38    


 


Albumin/Globulin Ratio  0.5 %  07/23/18  06:38    


 


TSH  1.830 mlU/mL (0.270-4.200)   07/21/18  14:25    


 


Urine Color  Yudi  (Yellow)   07/21/18  15:40    


 


Urine Turbidity  Clear  (Clear)   07/21/18  15:40    


 


Urine pH  5.0  (5.0-7.0)   07/21/18  15:40    


 


Ur Specific Gravity  1.017  (1.003-1.030)   07/21/18  15:40    


 


Urine Protein  100 mg/dl mg/dL (Negative)   07/21/18  15:40    


 


Urine Glucose (UA)  Neg mg/dL (Negative)   07/21/18  15:40    


 


Urine Ketones  Neg mg/dL (Negative)   07/21/18  15:40    


 


Urine Blood  Sm  (Negative)   07/21/18  15:40    


 


Urine Nitrite  Neg  (Negative)   07/21/18  15:40    


 


Urine Bilirubin  Neg  (Negative)   07/21/18  15:40    


 


Urine Urobilinogen  2.0 mg/dL (<2.0)   07/21/18  15:40    


 


Ur Leukocyte Esterase  Mod  (Negative)   07/21/18  15:40    


 


Urine WBC (Auto)  > 182.0 /HPF (0.0-6.0)  H  07/21/18  15:40    


 


Urine RBC (Auto)  17.0 /HPF (0.0-6.0)   07/21/18  15:40    


 


U Epithel Cells (Auto)  8.0 /HPF (0-13.0)   07/21/18  15:40    


 


Urine Bacteria (Auto)  4+ /HPF (Negative)   07/21/18  15:40    


 


Urine WBC Clumps  3+ /HPF  07/21/18  15:40    


 


Urine Mucus  Few /HPF  07/21/18  15:40    


 


Urine Opiates Screen  Presumptive negative   07/21/18  15:40    


 


Urine Methadone Screen  Presumptive positive   07/21/18  15:40    


 


Ur Barbiturates Screen  Presumptive negative   07/21/18  15:40    


 


Ur Phencyclidine Scrn  Presumptive negative   07/21/18  15:40    


 


Ur Amphetamines Screen  Presumptive negative   07/21/18  15:40    


 


U Benzodiazepines Scrn  Presumptive negative   07/21/18  15:40    


 


Urine Cocaine Screen  Presumptive negative   07/21/18  15:40    


 


U Marijuana (THC) Screen  Presumptive negative   07/21/18  15:40    


 


Drugs of Abuse Note  Disclamer   07/21/18  15:40    


 


Plasma/Serum Alcohol  < 0.01 % (0-0.07)   07/21/18  17:09

## 2018-07-26 NOTE — PROGRESS NOTE
Assessment and Plan


Assessment:


Sepsis / staph aureus bacteremia


PNA


UTI


Lactic acidosis


Encephalopathy


Pneumothorax


Stage IV decubitus ulcer


CAD s/p CABG and PCI


H/o recent STEMI on 1/3/2018 - [facility] showed triple vessel CAD and severe lv 

systolic dysfunction with EF 25%, pt declined re-do CABG at [place]


Chronic combined systolic and diastolic heart failure 


ICMP - EF 40-45% per echo 1/5/2018 and 15-20% per echo 7/22/2018


H/o sarcoidosis


H/o rheumatoid arthritis


Hypernatremia


Hypokalemia / hypomag


Anemia


Thrombocytopenia 





Plan:


Cont present cardiac regimen. 


Overall poor prognosis. 





The patient has been seen in conjunction with Dr. Redmond who agrees with the 

assessment and plan of care.








Subjective


Date of service: 07/26/18


Principal diagnosis: Staph bacteremia


Interval history: 


pt resting in bed, remains lethargic, daughter at bedside. s/p PICC placement 

this AM. 








Objective


 


 Last Vital Signs











Temp  101 F H  07/26/18 08:00


 


Pulse  95 H  07/26/18 09:51


 


Resp  28 H  07/26/18 09:51


 


BP  105/50   07/26/18 09:51


 


Pulse Ox  98   07/26/18 09:51

















- Physical Examination


General: Other (lethargic)


Neck: Positive: neck supple


Cardiac: Positive: Reg Rate and Rhythm, S1/S2


Lungs: Positive: Decreased Breath Sounds, Rhonchi


Neuro: Positive: Other (lethargic)


Abdomen: Positive: Active Bowel Sounds


Skin: Positive: Other (decubitus sacral ulcer).  Negative: Rash


Extremities: Absent: edema





- Labs and Meds


 CBC











  07/26/18 Range/Units





  Unknown 


 


WBC  9.7  (4.5-11.0)  K/mm3


 


RBC  3.41 L  (3.65-5.03)  M/mm3


 


Hgb  9.0 L  (10.1-14.3)  gm/dl


 


Hct  28.8 L  (30.3-42.9)  %


 


Plt Count  67 L  (140-440)  K/mm3


 


Lymph #  0.7 L  (1.2-5.4)  K/mm3


 


Mono #  0.2  (0.0-0.8)  K/mm3


 


Eos #  0.0  (0.0-0.4)  K/mm3


 


Baso #  0.0  (0.0-0.1)  K/mm3








 Comprehensive Metabolic Panel











  07/25/18 07/25/18 07/26/18 Range/Units





  12:04 20:07 Unknown 


 


Sodium  157 H   157 H  (137-145)  mmol/L


 


Potassium  2.4 L*  3.1 L D  3.5 L  (3.6-5.0)  mmol/L


 


Chloride  116.4 H   120.1 H  ()  mmol/L


 


Carbon Dioxide  24   25  (22-30)  mmol/L


 


BUN  22 H   25 H  (7-17)  mg/dL


 


Creatinine  0.8   1.0  (0.7-1.2)  mg/dL


 


Glucose  227 H   268 H  ()  mg/dL


 


Calcium  7.6 L   7.4 L  (8.4-10.2)  mg/dL














- Imaging and Cardiology


EKG: report reviewed (Sinus Tach 114/min)

## 2018-07-26 NOTE — XRAY REPORT
AP CHEST:



HISTORY: Follow up right pneumothorax



Since yesterday's exam, the right pneumothorax has decreased by 

approximately 50%. The pneumothorax measures 1 cm at the right apex. 

Mild congestive changes and small pleural effusions are unchanged. Mild 

bibasilar atelectasis. Heart size is stable and within normal limits.



IMPRESSION:

50% improvement in the right pneumothorax which is estimated at 10-15%.

## 2018-07-26 NOTE — PROGRESS NOTE
Subjective


Date of service: 07/26/18


Principal diagnosis: Staph bacteremia





Objective


 Vital Signs - 12hr











  07/26/18 07/26/18 07/26/18





  01:40 03:17 07:41


 


Temperature 99.9 F H  101.0 F H


 


Pulse Rate 101 H  101 H


 


Pulse Rate [  75 





Right Lower   





Lobe]   


 


Respiratory 18  18





Rate   


 


Respiratory  16 





Rate [Right   





Lower Lobe]   


 


Blood Pressure 108/53  102/45


 


Blood Pressure   





[Right]   


 


O2 Sat by Pulse 100  91





Oximetry   














  07/26/18 07/26/18 07/26/18





  07:42 08:00 09:51


 


Temperature  101 F H 


 


Pulse Rate 95 H  95 H


 


Pulse Rate [   





Right Lower   





Lobe]   


 


Respiratory   28 H





Rate   


 


Respiratory   





Rate [Right   





Lower Lobe]   


 


Blood Pressure   


 


Blood Pressure  102/45 105/50





[Right]   


 


O2 Sat by Pulse 91  98





Oximetry   











Constitutional: lethargic


Eyes: non-icteric


Ascultation: Bilateral: diminished breath sounds


CBC and BMP: 


 07/26/18 Unknown





 07/26/18 Unknown


ABG, PT/INR, D-dimer: 


ABG











POC ABG pH  7.470  (7.35-7.45)  H  07/21/18  16:44    


 


POC ABG pCO2  42.7  (35-45)   07/21/18  16:44    


 


POC ABG pO2  156  ()  H  07/21/18  16:44    


 


POC ABG HCO3  31.1   07/21/18  16:44    


 


POC ABG Total CO2  32   07/21/18  16:44    


 


POC ABG O2 Sat  99   07/21/18  16:44    





PT/INR, D-dimer











PT  18.8 Sec. (12.2-14.9)  H  07/21/18  16:04    


 


INR  1.48  (0.87-1.13)  H  07/21/18  16:04    








Abnormal lab findings: 


 Abnormal Labs











  07/21/18 07/21/18 07/21/18





  15:40 16:04 16:04


 


WBC   24.9 H 


 


RBC   


 


Hgb   


 


Hct   


 


MCH   26 L 


 


RDW   16.6 H 


 


Plt Count   


 


Lymph % (Auto)   


 


Lymph #   


 


Seg Neutrophils %   


 


Seg Neuts % (Manual)   97.0 H 


 


Lymphocytes % (Manual)   1.5 L 


 


Seg Neutrophils #   


 


Seg Neutrophils # Man   24.2 H 


 


Lymphocytes # (Manual)   0.4 L 


 


PT    18.8 H


 


INR    1.48 H


 


POC ABG pH   


 


POC ABG pO2   


 


VBG pH   


 


Sodium   


 


Potassium   


 


Chloride   


 


BUN   


 


Creatinine   


 


Glucose   


 


POC Glucose   


 


Lactic Acid   


 


Calcium   


 


Magnesium   


 


Total Bilirubin   


 


AST   


 


Alkaline Phosphatase   


 


C-Reactive Protein   


 


NT-Pro-B Natriuret Pep   


 


Total Protein   


 


Albumin   


 


Urine WBC (Auto)  > 182.0 H  














  07/21/18 07/21/18 07/21/18





  16:04 16:04 16:04


 


WBC   


 


RBC   


 


Hgb   


 


Hct   


 


MCH   


 


RDW   


 


Plt Count   


 


Lymph % (Auto)   


 


Lymph #   


 


Seg Neutrophils %   


 


Seg Neuts % (Manual)   


 


Lymphocytes % (Manual)   


 


Seg Neutrophils #   


 


Seg Neutrophils # Man   


 


Lymphocytes # (Manual)   


 


PT   


 


INR   


 


POC ABG pH   


 


POC ABG pO2   


 


VBG pH    7.308 L


 


Sodium   


 


Potassium   


 


Chloride  95.6 L  


 


BUN  24 H  


 


Creatinine  1.6 H  


 


Glucose  162 H  


 


POC Glucose   


 


Lactic Acid   8.80 H* 


 


Calcium   


 


Magnesium   


 


Total Bilirubin  1.90 H  


 


AST  68 H  


 


Alkaline Phosphatase  253 H  


 


C-Reactive Protein   


 


NT-Pro-B Natriuret Pep   


 


Total Protein  5.8 L  


 


Albumin  1.9 L  


 


Urine WBC (Auto)   














  07/21/18 07/21/18 07/21/18





  16:04 16:44 23:03


 


WBC   


 


RBC   


 


Hgb   


 


Hct   


 


MCH   


 


RDW   


 


Plt Count   


 


Lymph % (Auto)   


 


Lymph #   


 


Seg Neutrophils %   


 


Seg Neuts % (Manual)   


 


Lymphocytes % (Manual)   


 


Seg Neutrophils #   


 


Seg Neutrophils # Man   


 


Lymphocytes # (Manual)   


 


PT   


 


INR   


 


POC ABG pH   7.470 H 


 


POC ABG pO2   156 H 


 


VBG pH   


 


Sodium   


 


Potassium   


 


Chloride   


 


BUN   


 


Creatinine   


 


Glucose   


 


POC Glucose    158 H


 


Lactic Acid   


 


Calcium   


 


Magnesium   


 


Total Bilirubin   


 


AST   


 


Alkaline Phosphatase   


 


C-Reactive Protein   


 


NT-Pro-B Natriuret Pep  10165 H  


 


Total Protein   


 


Albumin   


 


Urine WBC (Auto)   














  07/21/18 07/22/18 07/22/18





  23:08 12:36 15:38


 


WBC   


 


RBC   


 


Hgb   


 


Hct   


 


MCH   


 


RDW   


 


Plt Count   


 


Lymph % (Auto)   


 


Lymph #   


 


Seg Neutrophils %   


 


Seg Neuts % (Manual)   


 


Lymphocytes % (Manual)   


 


Seg Neutrophils #   


 


Seg Neutrophils # Man   


 


Lymphocytes # (Manual)   


 


PT   


 


INR   


 


POC ABG pH   


 


POC ABG pO2   


 


VBG pH   


 


Sodium   


 


Potassium   


 


Chloride   


 


BUN   


 


Creatinine   


 


Glucose   


 


POC Glucose   109 H  110 H


 


Lactic Acid  3.00 H*  


 


Calcium   


 


Magnesium   


 


Total Bilirubin   


 


AST   


 


Alkaline Phosphatase   


 


C-Reactive Protein   


 


NT-Pro-B Natriuret Pep   


 


Total Protein   


 


Albumin   


 


Urine WBC (Auto)   














  07/22/18 07/22/18 07/23/18





  18:24 18:24 06:38


 


WBC  17.4 H   15.7 H


 


RBC   


 


Hgb    9.9 L


 


Hct   


 


MCH  26 L   26 L


 


RDW  16.5 H   16.6 H


 


Plt Count    135 L


 


Lymph % (Auto)  7.8 L  


 


Lymph #   


 


Seg Neutrophils %  89.0 H  


 


Seg Neuts % (Manual)    95.0 H


 


Lymphocytes % (Manual)    1.0 L


 


Seg Neutrophils #  15.5 H  


 


Seg Neutrophils # Man    14.9 H


 


Lymphocytes # (Manual)    0.2 L


 


PT   


 


INR   


 


POC ABG pH   


 


POC ABG pO2   


 


VBG pH   


 


Sodium   


 


Potassium   3.5 L 


 


Chloride   


 


BUN   26 H 


 


Creatinine   


 


Glucose   


 


POC Glucose   


 


Lactic Acid   


 


Calcium   8.1 L 


 


Magnesium   


 


Total Bilirubin   1.40 H 


 


AST   


 


Alkaline Phosphatase   203 H 


 


C-Reactive Protein   


 


NT-Pro-B Natriuret Pep   


 


Total Protein   5.4 L 


 


Albumin   1.6 L 


 


Urine WBC (Auto)   














  07/23/18 07/23/18 07/23/18





  06:38 10:08 22:37


 


WBC   


 


RBC   


 


Hgb   


 


Hct   


 


MCH   


 


RDW   


 


Plt Count   


 


Lymph % (Auto)   


 


Lymph #   


 


Seg Neutrophils %   


 


Seg Neuts % (Manual)   


 


Lymphocytes % (Manual)   


 


Seg Neutrophils #   


 


Seg Neutrophils # Man   


 


Lymphocytes # (Manual)   


 


PT   


 


INR   


 


POC ABG pH   


 


POC ABG pO2   


 


VBG pH   


 


Sodium  150 H  


 


Potassium  3.0 L  


 


Chloride  107.9 H  


 


BUN  24 H  


 


Creatinine   


 


Glucose  58 L  


 


POC Glucose   69 L 


 


Lactic Acid   


 


Calcium  7.7 L  


 


Magnesium  1.60 L  


 


Total Bilirubin  1.30 H  


 


AST   


 


Alkaline Phosphatase  166 H  


 


C-Reactive Protein    25.70 H


 


NT-Pro-B Natriuret Pep   


 


Total Protein  4.8 L  


 


Albumin  1.5 L  


 


Urine WBC (Auto)   














  07/24/18 07/24/18 07/25/18





  10:49 21:06 01:39


 


WBC   


 


RBC   


 


Hgb   


 


Hct   


 


MCH   


 


RDW   


 


Plt Count   


 


Lymph % (Auto)   


 


Lymph #   


 


Seg Neutrophils %   


 


Seg Neuts % (Manual)   


 


Lymphocytes % (Manual)   


 


Seg Neutrophils #   


 


Seg Neutrophils # Man   


 


Lymphocytes # (Manual)   


 


PT   


 


INR   


 


POC ABG pH   


 


POC ABG pO2   


 


VBG pH   


 


Sodium   


 


Potassium   


 


Chloride   


 


BUN   


 


Creatinine   


 


Glucose   


 


POC Glucose  233 H  301 H  262 H


 


Lactic Acid   


 


Calcium   


 


Magnesium   


 


Total Bilirubin   


 


AST   


 


Alkaline Phosphatase   


 


C-Reactive Protein   


 


NT-Pro-B Natriuret Pep   


 


Total Protein   


 


Albumin   


 


Urine WBC (Auto)   














  07/25/18 07/25/18 07/25/18





  06:42 11:40 12:04


 


WBC   


 


RBC   


 


Hgb   


 


Hct   


 


MCH   


 


RDW   


 


Plt Count   


 


Lymph % (Auto)   


 


Lymph #   


 


Seg Neutrophils %   


 


Seg Neuts % (Manual)   


 


Lymphocytes % (Manual)   


 


Seg Neutrophils #   


 


Seg Neutrophils # Man   


 


Lymphocytes # (Manual)   


 


PT   


 


INR   


 


POC ABG pH   


 


POC ABG pO2   


 


VBG pH   


 


Sodium    157 H


 


Potassium    2.4 L*


 


Chloride    116.4 H


 


BUN    22 H


 


Creatinine   


 


Glucose    227 H


 


POC Glucose  226 H  190 H 


 


Lactic Acid   


 


Calcium    7.6 L


 


Magnesium   


 


Total Bilirubin   


 


AST   


 


Alkaline Phosphatase   


 


C-Reactive Protein   


 


NT-Pro-B Natriuret Pep   


 


Total Protein   


 


Albumin   


 


Urine WBC (Auto)   














  07/25/18 07/25/18 07/25/18





  18:32 20:07 23:50


 


WBC   


 


RBC   


 


Hgb   


 


Hct   


 


MCH   


 


RDW   


 


Plt Count   


 


Lymph % (Auto)   


 


Lymph #   


 


Seg Neutrophils %   


 


Seg Neuts % (Manual)   


 


Lymphocytes % (Manual)   


 


Seg Neutrophils #   


 


Seg Neutrophils # Man   


 


Lymphocytes # (Manual)   


 


PT   


 


INR   


 


POC ABG pH   


 


POC ABG pO2   


 


VBG pH   


 


Sodium   


 


Potassium   3.1 L D 


 


Chloride   


 


BUN   


 


Creatinine   


 


Glucose   


 


POC Glucose  331 H   266 H


 


Lactic Acid   


 


Calcium   


 


Magnesium   


 


Total Bilirubin   


 


AST   


 


Alkaline Phosphatase   


 


C-Reactive Protein   


 


NT-Pro-B Natriuret Pep   


 


Total Protein   


 


Albumin   


 


Urine WBC (Auto)   














  07/26/18 07/26/18 07/26/18





  06:36 11:34 Unknown


 


WBC   


 


RBC    3.41 L


 


Hgb    9.0 L


 


Hct    28.8 L


 


MCH    27 L


 


RDW    16.6 H


 


Plt Count    67 L


 


Lymph % (Auto)    7.6 L


 


Lymph #    0.7 L


 


Seg Neutrophils %    90.0 H


 


Seg Neuts % (Manual)   


 


Lymphocytes % (Manual)   


 


Seg Neutrophils #    8.8 H


 


Seg Neutrophils # Man   


 


Lymphocytes # (Manual)   


 


PT   


 


INR   


 


POC ABG pH   


 


POC ABG pO2   


 


VBG pH   


 


Sodium   


 


Potassium   


 


Chloride   


 


BUN   


 


Creatinine   


 


Glucose   


 


POC Glucose  280 H  288 H 


 


Lactic Acid   


 


Calcium   


 


Magnesium   


 


Total Bilirubin   


 


AST   


 


Alkaline Phosphatase   


 


C-Reactive Protein   


 


NT-Pro-B Natriuret Pep   


 


Total Protein   


 


Albumin   


 


Urine WBC (Auto)   














  07/26/18





  Unknown


 


WBC 


 


RBC 


 


Hgb 


 


Hct 


 


MCH 


 


RDW 


 


Plt Count 


 


Lymph % (Auto) 


 


Lymph # 


 


Seg Neutrophils % 


 


Seg Neuts % (Manual) 


 


Lymphocytes % (Manual) 


 


Seg Neutrophils # 


 


Seg Neutrophils # Man 


 


Lymphocytes # (Manual) 


 


PT 


 


INR 


 


POC ABG pH 


 


POC ABG pO2 


 


VBG pH 


 


Sodium  157 H


 


Potassium  3.5 L


 


Chloride  120.1 H


 


BUN  25 H


 


Creatinine 


 


Glucose  268 H


 


POC Glucose 


 


Lactic Acid 


 


Calcium  7.4 L


 


Magnesium 


 


Total Bilirubin 


 


AST 


 


Alkaline Phosphatase 


 


C-Reactive Protein 


 


NT-Pro-B Natriuret Pep 


 


Total Protein 


 


Albumin 


 


Urine WBC (Auto)

## 2018-07-26 NOTE — EVENT NOTE
Date: 07/26/18


Reviewed CXR. 





Improvement in right pneumothorax. 





Given patient's comorbidites and lack of symptoms, following with serial CXR 

with supplementary oxygen is reasonable. Ordered CXR for tomorrow.

## 2018-07-26 NOTE — XRAY REPORT
AP CHEST:



HISTORY: Left arm PICC placement



A left arm PICC has been inserted which terminates near the cavoatrial 

junction. The remainder of the examination appears unchanged since 

earlier today at 0752 hours.



IMPRESSION:

The left arm PICC terminates at the cavoatrial junction.

## 2018-07-27 VITALS — SYSTOLIC BLOOD PRESSURE: 80 MMHG | DIASTOLIC BLOOD PRESSURE: 11 MMHG

## 2018-07-27 LAB
%HYPO/RBC NFR BLD AUTO: (no result) %
ANISOCYTOSIS BLD QL SMEAR: (no result)
BAND NEUTROPHILS # (MANUAL): 0.9 K/MM3
BASOPHILS # (AUTO): 0 K/MM3 (ref 0–0.1)
BASOPHILS NFR BLD AUTO: 0.1 % (ref 0–1.8)
BUN SERPL-MCNC: 33 MG/DL (ref 7–17)
BUN SERPL-MCNC: 33 MG/DL (ref 7–17)
BUN/CREAT SERPL: 25 %
BUN/CREAT SERPL: 30 %
CALCIUM SERPL-MCNC: 7.3 MG/DL (ref 8.4–10.2)
CALCIUM SERPL-MCNC: 7.5 MG/DL (ref 8.4–10.2)
EOSINOPHIL # BLD AUTO: 0 K/MM3 (ref 0–0.4)
EOSINOPHIL NFR BLD AUTO: 0.2 % (ref 0–4.3)
HCT VFR BLD CALC: 24.4 % (ref 30.3–42.9)
HCT VFR BLD CALC: 24.8 % (ref 30.3–42.9)
HEMOLYSIS INDEX: 3
HEMOLYSIS INDEX: 9
HGB BLD-MCNC: 7.7 GM/DL (ref 10.1–14.3)
HGB BLD-MCNC: 7.9 GM/DL (ref 10.1–14.3)
LYMPHOCYTES # BLD AUTO: 1 K/MM3 (ref 1.2–5.4)
LYMPHOCYTES NFR BLD AUTO: 8.4 % (ref 13.4–35)
MCH RBC QN AUTO: 27 PG (ref 28–32)
MCH RBC QN AUTO: 27 PG (ref 28–32)
MCHC RBC AUTO-ENTMCNC: 32 % (ref 30–34)
MCHC RBC AUTO-ENTMCNC: 32 % (ref 30–34)
MCV RBC AUTO: 83 FL (ref 79–97)
MCV RBC AUTO: 84 FL (ref 79–97)
MONOCYTES # (AUTO): 0.2 K/MM3 (ref 0–0.8)
MONOCYTES % (AUTO): 2 % (ref 0–7.3)
MYELOCYTES # (MANUAL): 0 K/MM3
PLATELET # BLD: 73 K/MM3 (ref 140–440)
PLATELET # BLD: 78 K/MM3 (ref 140–440)
PROMYELOCYTES # (MANUAL): 0 K/MM3
RBC # BLD AUTO: 2.89 M/MM3 (ref 3.65–5.03)
RBC # BLD AUTO: 2.98 M/MM3 (ref 3.65–5.03)
TOTAL CELLS COUNTED BLD: 100

## 2018-07-27 RX ADMIN — LOSARTAN POTASSIUM SCH: 25 TABLET, FILM COATED ORAL at 12:02

## 2018-07-27 RX ADMIN — FUROSEMIDE SCH: 10 INJECTION, SOLUTION INTRAVENOUS at 12:02

## 2018-07-27 RX ADMIN — ASPIRIN SCH: 81 TABLET, CHEWABLE ORAL at 12:00

## 2018-07-27 RX ADMIN — POTASSIUM CHLORIDE SCH: 1.5 POWDER, FOR SOLUTION ORAL at 12:03

## 2018-07-27 RX ADMIN — CLOPIDOGREL BISULFATE SCH: 75 TABLET ORAL at 12:03

## 2018-07-27 RX ADMIN — ALBUTEROL SULFATE SCH: 2.5 SOLUTION RESPIRATORY (INHALATION) at 02:06

## 2018-07-27 RX ADMIN — ALBUTEROL SULFATE SCH MG: 2.5 SOLUTION RESPIRATORY (INHALATION) at 02:03

## 2018-07-27 RX ADMIN — FAMOTIDINE SCH: 20 TABLET ORAL at 12:02

## 2018-07-27 RX ADMIN — METOPROLOL TARTRATE SCH: 25 TABLET, FILM COATED ORAL at 12:00

## 2018-07-27 RX ADMIN — ALBUTEROL SULFATE SCH: 2.5 SOLUTION RESPIRATORY (INHALATION) at 13:36

## 2018-07-27 RX ADMIN — Medication SCH: at 12:01

## 2018-07-27 RX ADMIN — ALBUTEROL SULFATE SCH MG: 2.5 SOLUTION RESPIRATORY (INHALATION) at 02:10

## 2018-07-27 RX ADMIN — GABAPENTIN SCH: 300 CAPSULE ORAL at 12:02

## 2018-07-27 NOTE — XRAY REPORT
Single view chest: Compared to 7/26/18.



History: Right pneumothorax.



Findings:



There is persistence of hydropneumothorax noted on the right side. No 

significant interval change.



No consolidation. Suspected minimal left pleural effusion. Stable 

support system and



Impression:



No significant interval change. Stable support system

## 2018-07-27 NOTE — DEATH SUMMARY
Death Summary





- Providers


Date of service: 18


Consults: 


 





18 23:07


Consult to Wound/ET Nurse [CONS] Routine 


   Reason For Exam: wound eval





18 07:18


Consult to Physician [CONS] Routine 


   Comment: 


   Consulting Provider: GIBRAN COLLINS


   Physician Instructions: 


   Reason For Exam: Sacral decub ulcer





18 08:24


Consult to Dietitian/Nutrition [CONS] Routine 


   Physician Instructions: 


   Reason For Exam: severe malnutrition


   Reason for Consult: Malnutrition





18 08:39


Consult to Dietitian/Nutrition [CONS] Routine 


   Physician Instructions: Assess nutrtn needs, initiate, modify, manage TF


   Reason For Exam: 


   Reason for Consult: Write/Manage Tube Feeding


   Reason for Consult: Write/Manage Tube Feeding





18 09:07


Consult to Physician [CONS] Routine 


   Comment: 


   Consulting Provider: ALICIA MAYO


   Physician Instructions: 


   Reason For Exam: Staph aureus bacteremia





18 10:16


Consult to Case Management [CONS] Stat 


   Services Needed at Discharge: Other


   Notified:: on call


   Additional Physician Instructions: [facility]


                                        ([facility])


                                        M [phone]


                                        O [phone]


                                        F [phone]


                                        OUTPATIENT PARENTERAL ANTIBIOTIC 

THERAPY ORDERS


                                        Diagnoses: MSSA septicemia


                                        Antimicrobial administration:  

cefazolin 2 g IV q8h total 14 days until


                                        . Remove PICC line after last dose 

unless otherwise instructed.


                                        Lines: PICC


                                        Lab monitoring:


                                        CBC, BMP, CRP once a week preferly on 

Monday morning.


                                        Please fax results to  [phone] and 

call [phone] for critical lab


                                        results.


                                        Alicia Brown


                                        Date: 18 10:17


Consult to PICC Line RN [CONS] Stat 


   Reason For Exam: IV antibiotics


   Type Line:: PICC





18 11:12


Consult to Interventional Radiology [CONS] Routine 


   Consulting Provider: ZHOU PADILLA


   Reason For Exam: PTX, chest tube


   Place consult to:: 


   Notified:: 


   Phone number called:: [phone]


   Was contact made?: Yes


   If yes, spoke with:: BENJAMIN


   Time called:: 11:45


   Comment:: TONJA





18 11:13


Consult to Physician [CONS] Routine 


   Comment: 


   Consulting Provider: ESTEVAN FULLER


   Physician Instructions: 


   Reason For Exam: PTX





18 11:16


Consult to Cardiology [CONS] Routine 


   Consulting Provider: CARLITA PARMAR


   Reason For Exam: systolic HF





18 12:04


Speech Therapy Evaluation and Treat [CONS] Routine 


   Reason For Exam: swallow eval





18 12:07


Speech Therapy Evaluation and Treat [CONS] Routine 


   Reason For Exam: diet consult





18 09:49


Consult to Physician [CONS] Routine 


   Comment: 


   Consulting Provider: BENJAMIN ROBERTS


   Physician Instructions: 


   Reason For Exam: hypernatremia





18 12:08


Consult to Dietitian/Nutrition [CONS] Routine 


   Physician Instructions: Assess nutrtn needs, initiate, modify, manage TF


   Reason For Exam: 


   Reason for Consult: Write/Manage Tube Feeding


   Reason for Consult: Write/Manage Tube Feeding











Attending: 


AMMON PERRY








- Death summary


Date of admission: 


18 17:01





Date of Death: 18


Reason for admission: Staph aureus bacteremia


Disposition: 





69 y/o female admitted due to altered mental status and  progressive SOB. 

Patient has been mostly bed ridden since 2017. As per the patient's daughter

, the patient has exhibited a constant severe decrease in mental status for 24h 

prior to admission and anorexia. Per daughter, she developed sacral decubitus 

while admitted at [facility].  In the ED< temp, 99.1, , R19, BP 87/56

, WBC 24.9K, Hg 11.1, Plat 247. Creat 1.2. Lactate 1.8. Total bili 1.4. CXR new 

RLL consolidation.  The patient has a significant past medical history for CAD s

/p CABG and PCI, recent STEMI on 1/3/2018 - [facility] showed triple vessel CAD and 

severe lv systolic dysfunction with EF 25%, pt declined re-do CABG at [place], 

chronic combined systolic and diastolic HF, ICMP, sarcoidosis, rheumatoid 

arthritis, decubitus sacral ulcer.  Since admission, she had been diagnosed 

with staph aureus bacteremia, sepsis, UTI, lactic acidosis, encephalopathy, 

malnutrition.  Also, She was incidentally found to have right pneumothorax (20%)

.  Patient was seen by IR in consultation who recommended for conservative 

management because the patient had no respiratory compromise.  The patient was 

seen by ID in consultation who managed the IV antibiotics and treatment of the 

sepsis/pneumonia/staph bacteremia.  The patient was also seen by cardiology for 

congestive heart failure.  Echocardiogram completed on 18 revealed EF 15-20

%, LV severely dilated, mild to mod LVH, LA mildly dilated, mod to severe MR, 

mild AR, mild to mod TR.  Unfortunately, patient continued to have overall 

deterioration and declined hospital stay.  This was felt to have a poor 

prognosis which was conveyed to the family on multiple occasions.  On 18 

patient developed respiratory distress and the family opted for DO NOT 

RESUSCITATE.  The patient later  and pronounced dead at 11 AM.  

Dedicated the summer time 35 minutes.








- Final diagnosis


(1) Acute lower UTI (urinary tract infection)


Note: 


Final diagnosis:














(2) Encephalopathy acute


Note: 


Final diagnosis:














(3) Pneumonia


Qualifiers: 


   Pneumonia type: due to unspecified organism   Laterality: right   Lung 

location: lower lobe of lung   Qualified Code(s): J18.1 - Lobar pneumonia, 

unspecified organism   


Note: 


Final diagnosis:














(4) Sacral decubitus ulcer


Qualifiers: 


   Pressure injury stage: stage 4   Qualified Code(s): L89.154 - Pressure ulcer 

of sacral region, stage 4   


Note: 


Final diagnosis:














(5) Sepsis


Qualifiers: 


   Sepsis type: sepsis due to unspecified organism   Qualified Code(s): A41.9 - 

Sepsis, unspecified organism   


Note: 


Final diagnosis:














(6) Acute on chronic diastolic heart failure


Note: 


Final diagnosis:














(7) Cardiomyopathy


Note: 


Final diagnosis:














(8) Pulmonary hypertension


Note: 


Final diagnosis:














(9) Sarcoidosis


Note: 


Final diagnosis:














(10) Type II diabetes mellitus, uncontrolled


Note: 


Final diagnosis:

## 2018-07-27 NOTE — EVENT NOTE
Date: 07/27/18





Pt has deteriorated and become hypotensive and more tachypneic.  D/W the son (

Svetlana) who is the decision maker for the family that they want nothing done 

and to initiate DNR

## 2018-12-17 NOTE — PROGRESS NOTE
Assessment and Plan





Hypernatremia secondary to poor water intake:


- Sodium level 157 today


- Will start D5W@75 ml/hr


- On free water flushes 250ml every 4 hrs


- Serial  sodium checks every 6 hours


- Urine osmo-362, Urine creatinine 30.5


- Strict I&O monitoring








Hypokalemia:


- On Kcl 20 meq BID via NGT








MSSA Bacteremia:


UTI:


- On IV Cefazolin








Acute Hypoxemia Respiratory Failure:


Bilateral pneumonia: 


- IR following for pneumothorax, no chest tube for now, improving








Acute on chronic systolic congestive heart failure; EF 15-20%:


- On Lasix 40 mg IV daily


- As per Cardiology








Stage IV decubitus ulcer


Surgery following, possible surgical debridement if needed








Metabolic encephalopathy


- possibly secondary to sepsis








Severe malnutrition/hypoalbuminemia; 


- TF via NGT


-Patient may need PEG placement at some point





Subjective


Date of service: 07/27/18


Principal diagnosis: Staph bacteremia





Objective





- Vital Signs


Vital signs: 


 Vital Signs - 12hr











  07/27/18 07/27/18 07/27/18





  01:29 02:03 07:22


 


Temperature 99.9 F H  99.7 F H


 


Pulse Rate 139 H  130 H


 


Pulse Rate [  22 L 





Right Lower   





Lobe]   


 


Respiratory 20  17





Rate   


 


Blood Pressure 89/50  80/11


 


O2 Sat by Pulse 90  94





Oximetry   














- Lab





 07/27/18 07:20





 07/27/18 07:20


 Most recent lab results











Calcium  7.3 mg/dL (8.4-10.2)  L  07/27/18  07:20    


 


Phosphorus  2.60 mg/dL (2.5-4.5)   07/23/18  06:38    


 


Magnesium  2.10 mg/dL (1.7-2.3)   07/26/18  Unknown


 


Urine Creatinine  30.5 mg/dL (0.1-20.0)  H  07/26/18  Unknown


 


Urine Sodium  59 mmol/L  07/26/18  Unknown (2) everted (after stimulation)

## 2023-11-02 NOTE — EVENT NOTE
Date: 01/03/18


Emergent left heart cath this afternoon revealed triple vessel disease. Will 

admit patient to telemetry overnight and resume heparin gtt 3 hours after 

sheath removed. Check echocardiogram. Likely transfer to [facility] tomorrow for CT 

surgery evaluation. Patient remains chest pain free at this time.
03-Sep-2023